# Patient Record
Sex: FEMALE | Race: WHITE | NOT HISPANIC OR LATINO | Employment: OTHER | URBAN - METROPOLITAN AREA
[De-identification: names, ages, dates, MRNs, and addresses within clinical notes are randomized per-mention and may not be internally consistent; named-entity substitution may affect disease eponyms.]

---

## 2023-03-30 DIAGNOSIS — I10 PRIMARY HYPERTENSION: Primary | ICD-10-CM

## 2023-03-30 RX ORDER — HYDROCHLOROTHIAZIDE 25 MG/1
TABLET ORAL
Qty: 90 TABLET | Refills: 3 | Status: SHIPPED | OUTPATIENT
Start: 2023-03-30 | End: 2023-12-27 | Stop reason: SDUPTHER

## 2023-06-26 PROBLEM — M15.0 PRIMARY OSTEOARTHRITIS INVOLVING MULTIPLE JOINTS: Status: ACTIVE | Noted: 2023-06-26

## 2023-06-26 PROBLEM — E03.9 ACQUIRED HYPOTHYROIDISM: Status: ACTIVE | Noted: 2023-06-26

## 2023-06-26 PROBLEM — I10 PRIMARY HYPERTENSION: Status: ACTIVE | Noted: 2023-06-26

## 2023-06-26 PROBLEM — M15.9 PRIMARY OSTEOARTHRITIS INVOLVING MULTIPLE JOINTS: Status: ACTIVE | Noted: 2023-06-26

## 2023-06-27 ENCOUNTER — OFFICE VISIT (OUTPATIENT)
Dept: PRIMARY CARE | Facility: CLINIC | Age: 83
End: 2023-06-27
Payer: MEDICARE

## 2023-06-27 ENCOUNTER — LAB (OUTPATIENT)
Dept: LAB | Facility: LAB | Age: 83
End: 2023-06-27
Payer: MEDICARE

## 2023-06-27 VITALS
WEIGHT: 199 LBS | OXYGEN SATURATION: 95 % | SYSTOLIC BLOOD PRESSURE: 166 MMHG | HEIGHT: 61 IN | DIASTOLIC BLOOD PRESSURE: 94 MMHG | HEART RATE: 76 BPM | BODY MASS INDEX: 37.57 KG/M2 | TEMPERATURE: 98.2 F

## 2023-06-27 DIAGNOSIS — Z00.00 MEDICARE ANNUAL WELLNESS VISIT, SUBSEQUENT: Primary | ICD-10-CM

## 2023-06-27 DIAGNOSIS — Z00.00 PHYSICAL EXAM: ICD-10-CM

## 2023-06-27 DIAGNOSIS — I10 PRIMARY HYPERTENSION: ICD-10-CM

## 2023-06-27 DIAGNOSIS — M15.9 PRIMARY OSTEOARTHRITIS INVOLVING MULTIPLE JOINTS: ICD-10-CM

## 2023-06-27 DIAGNOSIS — Z00.00 ROUTINE GENERAL MEDICAL EXAMINATION AT HEALTH CARE FACILITY: ICD-10-CM

## 2023-06-27 DIAGNOSIS — E03.9 ACQUIRED HYPOTHYROIDISM: ICD-10-CM

## 2023-06-27 LAB
ALANINE AMINOTRANSFERASE (SGPT) (U/L) IN SER/PLAS: 13 U/L (ref 7–45)
ALBUMIN (G/DL) IN SER/PLAS: 4.1 G/DL (ref 3.4–5)
ALKALINE PHOSPHATASE (U/L) IN SER/PLAS: 63 U/L (ref 33–136)
ANION GAP IN SER/PLAS: 13 MMOL/L (ref 10–20)
ASPARTATE AMINOTRANSFERASE (SGOT) (U/L) IN SER/PLAS: 17 U/L (ref 9–39)
BILIRUBIN TOTAL (MG/DL) IN SER/PLAS: 0.4 MG/DL (ref 0–1.2)
CALCIUM (MG/DL) IN SER/PLAS: 9.7 MG/DL (ref 8.6–10.3)
CARBON DIOXIDE, TOTAL (MMOL/L) IN SER/PLAS: 27 MMOL/L (ref 21–32)
CHLORIDE (MMOL/L) IN SER/PLAS: 103 MMOL/L (ref 98–107)
CHOLESTEROL (MG/DL) IN SER/PLAS: 178 MG/DL (ref 0–199)
CHOLESTEROL IN HDL (MG/DL) IN SER/PLAS: 48.7 MG/DL
CHOLESTEROL/HDL RATIO: 3.7
CREATININE (MG/DL) IN SER/PLAS: 1.01 MG/DL (ref 0.5–1.05)
ERYTHROCYTE DISTRIBUTION WIDTH (RATIO) BY AUTOMATED COUNT: 13.3 % (ref 11.5–14.5)
ERYTHROCYTE MEAN CORPUSCULAR HEMOGLOBIN CONCENTRATION (G/DL) BY AUTOMATED: 32.4 G/DL (ref 32–36)
ERYTHROCYTE MEAN CORPUSCULAR VOLUME (FL) BY AUTOMATED COUNT: 91 FL (ref 80–100)
ERYTHROCYTES (10*6/UL) IN BLOOD BY AUTOMATED COUNT: 4.37 X10E12/L (ref 4–5.2)
GFR FEMALE: 55 ML/MIN/1.73M2
GLUCOSE (MG/DL) IN SER/PLAS: 100 MG/DL (ref 74–99)
HEMATOCRIT (%) IN BLOOD BY AUTOMATED COUNT: 39.8 % (ref 36–46)
HEMOGLOBIN (G/DL) IN BLOOD: 12.9 G/DL (ref 12–16)
LDL: 89 MG/DL (ref 0–99)
LEUKOCYTES (10*3/UL) IN BLOOD BY AUTOMATED COUNT: 9.3 X10E9/L (ref 4.4–11.3)
NON HDL CHOLESTEROL: 129 MG/DL
PLATELETS (10*3/UL) IN BLOOD AUTOMATED COUNT: 436 X10E9/L (ref 150–450)
POTASSIUM (MMOL/L) IN SER/PLAS: 4.3 MMOL/L (ref 3.5–5.3)
PROTEIN TOTAL: 7 G/DL (ref 6.4–8.2)
SODIUM (MMOL/L) IN SER/PLAS: 139 MMOL/L (ref 136–145)
THYROTROPIN (MIU/L) IN SER/PLAS BY DETECTION LIMIT <= 0.05 MIU/L: 3.37 MIU/L (ref 0.44–3.98)
THYROXINE (T4) FREE (NG/DL) IN SER/PLAS: 2.85 NG/DL (ref 0.61–1.12)
TRIGLYCERIDE (MG/DL) IN SER/PLAS: 204 MG/DL (ref 0–149)
UREA NITROGEN (MG/DL) IN SER/PLAS: 26 MG/DL (ref 6–23)
VLDL: 41 MG/DL (ref 0–40)

## 2023-06-27 PROCEDURE — 1036F TOBACCO NON-USER: CPT | Performed by: FAMILY MEDICINE

## 2023-06-27 PROCEDURE — 84443 ASSAY THYROID STIM HORMONE: CPT

## 2023-06-27 PROCEDURE — 1170F FXNL STATUS ASSESSED: CPT | Performed by: FAMILY MEDICINE

## 2023-06-27 PROCEDURE — 99214 OFFICE O/P EST MOD 30 MIN: CPT | Performed by: FAMILY MEDICINE

## 2023-06-27 PROCEDURE — G0439 PPPS, SUBSEQ VISIT: HCPCS | Performed by: FAMILY MEDICINE

## 2023-06-27 PROCEDURE — 3077F SYST BP >= 140 MM HG: CPT | Performed by: FAMILY MEDICINE

## 2023-06-27 PROCEDURE — 36415 COLL VENOUS BLD VENIPUNCTURE: CPT

## 2023-06-27 PROCEDURE — 1159F MED LIST DOCD IN RCRD: CPT | Performed by: FAMILY MEDICINE

## 2023-06-27 PROCEDURE — 80053 COMPREHEN METABOLIC PANEL: CPT

## 2023-06-27 PROCEDURE — 85027 COMPLETE CBC AUTOMATED: CPT

## 2023-06-27 PROCEDURE — 84439 ASSAY OF FREE THYROXINE: CPT

## 2023-06-27 PROCEDURE — 3080F DIAST BP >= 90 MM HG: CPT | Performed by: FAMILY MEDICINE

## 2023-06-27 PROCEDURE — 99397 PER PM REEVAL EST PAT 65+ YR: CPT | Performed by: FAMILY MEDICINE

## 2023-06-27 PROCEDURE — 80061 LIPID PANEL: CPT

## 2023-06-27 RX ORDER — ASCORBIC ACID 500 MG
TABLET ORAL
COMMUNITY
Start: 2020-07-22

## 2023-06-27 RX ORDER — LOSARTAN POTASSIUM 50 MG/1
100 TABLET ORAL DAILY
COMMUNITY
End: 2023-12-27 | Stop reason: SDUPTHER

## 2023-06-27 RX ORDER — DIPHENHYDRAMINE HCL 25 MG
TABLET ORAL
COMMUNITY

## 2023-06-27 RX ORDER — LIDOCAINE 50 MG/G
PATCH TOPICAL
COMMUNITY
End: 2023-10-31

## 2023-06-27 RX ORDER — SELENIUM 50 MCG
50 TABLET ORAL DAILY
COMMUNITY

## 2023-06-27 RX ORDER — LEVOTHYROXINE SODIUM 50 UG/1
50 TABLET ORAL DAILY
COMMUNITY
End: 2023-09-15

## 2023-06-27 RX ORDER — AMLODIPINE BESYLATE 2.5 MG/1
2.5 TABLET ORAL DAILY
COMMUNITY
End: 2023-12-27 | Stop reason: SDUPTHER

## 2023-06-27 RX ORDER — CALCIUM CARBONATE 300MG(750)
1 TABLET,CHEWABLE ORAL DAILY
COMMUNITY
Start: 2020-07-22

## 2023-06-27 RX ORDER — CHOLECALCIFEROL (VITAMIN D3) 50 MCG
1 TABLET ORAL DAILY
COMMUNITY
Start: 2020-07-22

## 2023-06-27 RX ORDER — OMEPRAZOLE 20 MG/1
20 CAPSULE, DELAYED RELEASE ORAL DAILY
COMMUNITY
End: 2023-09-15

## 2023-06-27 ASSESSMENT — ENCOUNTER SYMPTOMS
ARTHRALGIAS: 1
DEPRESSION: 0
OCCASIONAL FEELINGS OF UNSTEADINESS: 1
BACK PAIN: 1
ENDOCRINE COMMENTS: SEE HPI
LOSS OF SENSATION IN FEET: 0

## 2023-06-27 ASSESSMENT — ACTIVITIES OF DAILY LIVING (ADL)
DOING_HOUSEWORK: INDEPENDENT
GROCERY_SHOPPING: INDEPENDENT
DRESSING: INDEPENDENT
TAKING_MEDICATION: INDEPENDENT
BATHING: INDEPENDENT
MANAGING_FINANCES: INDEPENDENT

## 2023-06-27 ASSESSMENT — PATIENT HEALTH QUESTIONNAIRE - PHQ9
SUM OF ALL RESPONSES TO PHQ9 QUESTIONS 1 AND 2: 0
SUM OF ALL RESPONSES TO PHQ9 QUESTIONS 1 AND 2: 0
2. FEELING DOWN, DEPRESSED OR HOPELESS: NOT AT ALL
1. LITTLE INTEREST OR PLEASURE IN DOING THINGS: NOT AT ALL
1. LITTLE INTEREST OR PLEASURE IN DOING THINGS: NOT AT ALL
2. FEELING DOWN, DEPRESSED OR HOPELESS: NOT AT ALL

## 2023-06-27 NOTE — PROGRESS NOTES
"Subjective   Reason for Visit: Pooja Wong is an 83 y.o. female here for a Medicare Wellness visit.          Reviewed all medications by prescribing practitioner or clinical pharmacist (such as prescriptions, OTCs, herbal therapies and supplements) and documented in the medical record.    She is here for Medicare wellness visit, general checkup and we are following her for hypertension, hypothyroidism and osteoarthritis particularly in her lower back.  In general, she feels well.        Patient Care Team:  Beni Dominguez MD as PCP - General  Beni Dominguez MD as PCP - [employer] PCP     Review of Systems   Cardiovascular:         See HPI   Endocrine:        See HPI   Musculoskeletal:  Positive for arthralgias, back pain and gait problem.   All other systems reviewed and are negative.      Objective   Vitals:  BP (!) 166/94 (BP Location: Left arm, Patient Position: Sitting, BP Cuff Size: Adult)   Pulse 76   Temp 36.8 °C (98.2 °F) (Skin)   Ht 1.549 m (5' 1\")   Wt 90.3 kg (199 lb)   SpO2 95%   BMI 37.60 kg/m²       Physical Exam  Cardiovascular:      Rate and Rhythm: Normal rate and regular rhythm.   Pulmonary:      Effort: Pulmonary effort is normal.      Breath sounds: Normal breath sounds.   Abdominal:      General: Abdomen is flat. There is no distension.      Palpations: Abdomen is soft. There is no mass.   Neurological:      General: No focal deficit present.      Mental Status: She is alert and oriented to person, place, and time.   Psychiatric:         Mood and Affect: Mood normal.         Behavior: Behavior normal.         Assessment/Plan   Problem List Items Addressed This Visit       Primary hypertension    Relevant Orders    CBC    Comprehensive Metabolic Panel    Lipid Panel    Acquired hypothyroidism    Relevant Orders    Thyroid Stimulating Hormone    Thyroxine, Free    Primary osteoarthritis involving multiple joints     Other Visit Diagnoses       Medicare annual " wellness visit, subsequent    -  Primary    Physical exam        Routine general medical examination at health care facility              I am updating her major blood chemistry today and she will follow-up with Dr. Bishop in 6 months.

## 2023-06-27 NOTE — PATIENT INSTRUCTIONS
I will notify you of your blood test results and it has been a pleasure for me to be your physician.

## 2023-06-28 ENCOUNTER — TELEPHONE (OUTPATIENT)
Dept: PRIMARY CARE | Facility: CLINIC | Age: 83
End: 2023-06-28
Payer: MEDICARE

## 2023-06-28 NOTE — TELEPHONE ENCOUNTER
Patient is taking Biotin.  Per Dr. Jett, do not change anything.  Biotin can give thyroid levels a false elevation.

## 2023-06-28 NOTE — TELEPHONE ENCOUNTER
----- Message from Beni Dominguez MD sent at 6/28/2023  4:55 AM EDT -----  Let her know her blood tests all look okay except the thyroid level got kicked out is being elevated.  Ask her if she is taking any biotin.  If so, that is likely the culprit and there is no further testing required.

## 2023-07-18 ENCOUNTER — PATIENT OUTREACH (OUTPATIENT)
Dept: CARE COORDINATION | Facility: CLINIC | Age: 83
End: 2023-07-18
Payer: MEDICARE

## 2023-09-14 DIAGNOSIS — K21.9 GASTROESOPHAGEAL REFLUX DISEASE WITHOUT ESOPHAGITIS: ICD-10-CM

## 2023-09-14 DIAGNOSIS — E03.9 ACQUIRED HYPOTHYROIDISM: Primary | ICD-10-CM

## 2023-09-15 RX ORDER — LEVOTHYROXINE SODIUM 50 UG/1
50 TABLET ORAL DAILY
Qty: 90 TABLET | Refills: 0 | Status: SHIPPED | OUTPATIENT
Start: 2023-09-15 | End: 2023-12-15

## 2023-09-15 RX ORDER — OMEPRAZOLE 20 MG/1
20 CAPSULE, DELAYED RELEASE ORAL DAILY
Qty: 30 CAPSULE | Refills: 0 | Status: SHIPPED | OUTPATIENT
Start: 2023-09-15 | End: 2023-11-27

## 2023-10-27 DIAGNOSIS — M15.9 PRIMARY OSTEOARTHRITIS INVOLVING MULTIPLE JOINTS: Primary | ICD-10-CM

## 2023-10-30 ENCOUNTER — TELEPHONE (OUTPATIENT)
Dept: PRIMARY CARE | Facility: CLINIC | Age: 83
End: 2023-10-30
Payer: MEDICARE

## 2023-10-30 NOTE — TELEPHONE ENCOUNTER
Patient states she has a lot of arthritis and theres a large lump on her back. (Ortho thought it was a big mass of muscle) She puts a patch on the lump at night to ease the pain so she can get sleep. She states it helps a lot. She's tried otc products and they don't seem to help   
n/a

## 2023-10-30 NOTE — TELEPHONE ENCOUNTER
This is a Dr. Jett pt.  The pt's , Rosalino, left a msg asking for a refill of the pt's 5% Lidocaine patch.  Pharm is [facility].

## 2023-10-31 RX ORDER — LIDOCAINE 50 MG/G
PATCH TOPICAL
Qty: 30 PATCH | Refills: 1 | Status: SHIPPED | OUTPATIENT
Start: 2023-10-31 | End: 2023-11-05 | Stop reason: SDUPTHER

## 2023-10-31 NOTE — TELEPHONE ENCOUNTER
I see you have seen this pt for her last 4 visits.  She is asking for a refill of her Lidocaine patches 5% for bilat sciatica.  I originally sent the msg to Piedad Nayak, per the alpha split.  Pt's  called again regarding the script. Pharm is [facility].

## 2023-11-05 DIAGNOSIS — M15.9 PRIMARY OSTEOARTHRITIS INVOLVING MULTIPLE JOINTS: ICD-10-CM

## 2023-11-05 RX ORDER — LIDOCAINE 50 MG/G
PATCH TOPICAL
Qty: 30 PATCH | Refills: 1 | Status: SHIPPED | OUTPATIENT
Start: 2023-11-05 | End: 2023-12-27 | Stop reason: SDUPTHER

## 2023-11-21 ENCOUNTER — OFFICE VISIT (OUTPATIENT)
Dept: UROLOGY | Facility: CLINIC | Age: 83
End: 2023-11-21
Payer: MEDICARE

## 2023-11-21 DIAGNOSIS — N81.89 PELVIC FLOOR WEAKNESS: ICD-10-CM

## 2023-11-21 DIAGNOSIS — Z96.0 PRESENCE OF PESSARY: ICD-10-CM

## 2023-11-21 DIAGNOSIS — N95.2 VAGINAL ATROPHY: ICD-10-CM

## 2023-11-21 DIAGNOSIS — N39.41 URGE URINARY INCONTINENCE: Primary | ICD-10-CM

## 2023-11-21 PROCEDURE — 1159F MED LIST DOCD IN RCRD: CPT | Performed by: OBSTETRICS & GYNECOLOGY

## 2023-11-21 PROCEDURE — 99213 OFFICE O/P EST LOW 20 MIN: CPT | Performed by: OBSTETRICS & GYNECOLOGY

## 2023-11-21 PROCEDURE — 1036F TOBACCO NON-USER: CPT | Performed by: OBSTETRICS & GYNECOLOGY

## 2023-11-21 NOTE — PROGRESS NOTES
Subjective   Patient ID: Pooja Wong is a 83 y.o. female who presents for pessary maintenance.  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    The patient continues to be  satisfied with the pessary, however she notes pressure around the pessary. She is not utilizing the vaginal estrogen cream thrice weekly. She denies any abnormal vaginal bleeding or discharge.     She continues to note urinary frequency and urgency with episodes of urgency. She utilizes a thin pads to avoid accidents.  All her voids are small volume and she has the urgency to void. She void 6-8 times during the day and is not bothered by this     She denies any bowel related complaints, no fecal or flatal incontinence.     She has no other complaints.    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     The patient is satisfied with the pessary, however she notes pressure around the pessary. She is also noting vaginal irritation and itching.She is not utilizing the vaginal estrogen cream thrice weekly. She denies any abnormal vaginal bleeding or discharge.     She continues to note urinary frequency and urgency. All her voids are small volume and she has the urgency to void. She void 6-8 times during the day and is not bothered by this     She denies any bowel related complaints, no fecal or flatal incontinence.     She has no other complaints.        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     The patient continues to note urinary frequency and urgency. All her voids are small volume and she has the urgency to void. She void 6-8 times during the day and is not bothered by this. She does not wish to try any medications as of today.     She is satisfied from the pessary standpoint, she denies any bulge complaints. She continues to utilize the vaginal estrogen cream thrice weekly. She denies any vaginal complaints, no abnormal vaginal bleeding or discharge.     She denies any bowel related complaints, no fecal or flatal incontinence.     She has no other complaints.           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     The patient presents today for pessary fitting, she denies any new complaints. She continues to utilize the vaginal estrogen cream.     She complaints of constipation but denies any fecal or flatal incontinence.      She has no other complaints.     From previous note   82- year-old patient presenting as a referral from Dr. Garcia with complaints of urinary urgency frequency and pelvic organ prolapse.     The patient complaints of urinary urgency and frequency, she voids every 2-3 hours during the day and notices 3-4 episodes of nocturia. She states she can hardly make it tot he bathroom without leaking in the morning. She denies any enuresis. She denies leaking on laughing, cough or sneezing. She has noticed UTI like symptoms in the past and utilizes Alfalfa with improvement in her symptoms. She denies any history of nephrolithiasis, gross hematuria or chronic recurrent UTIs.       She complaints of noticing bulge for the past several months which has gradually worsened. She has tried doing Kegels with no  improvement in her symptoms.She is sexually active. She complaints of episodic bleeding and irritation and vaginal burning when she voids. She denies any abnormal discharge. She has a history of complete hysterectomy approximately 40 years ago.     She denies any bowel related complaints, no fecal or flatal incontinence.     She has no other complaints.          Review of Systems  Constitutional: No fever, No chills and No fatigue.   Eyes: No vision problems and No dryness of the eyes.   ENT: No dry mouth, No hearing loss and No nosebleeds.   Cardiovascular: No chest pain, No palpitations and No orthopnea.   Respiratory: No shortness of breath, No cough and No wheezing.   Gastrointestinal: No abdominal pain, No constipation, No nausea, No diarrhea, No vomiting and No melena.   Genitourinary: As noted in HPI.   Musculoskeletal: No back pain, No myalgias, No muscle weakness, No joint swelling and No leg edema.   Integumentary: No rashes, No skin lesion and No itching.   Neurological: No headache, No numbness and No dizziness.   Psychiatric: No sleep disturbances, No anxiety and No depression.   Endocrine: No hot flashes, No loss of hair and No hirsutism.   Hematologic/Lymphatic: No swollen glands, No tendency for easy bleeding and No tendency for easy bruising.   All other systems have been reviewed and are negative for complaint.        Objective   Physical Exam  Patient ID: Pooja Wong is a 83 y.o. female.    Procedures  [facility] Procedure:  Pessary check.   Indication (s): pelvic organ prolapse.   Findings include: cystocele,~vaginal vault prolapse,~atrophy~and~2.5 inch Gellhorn short stem was removed, cleaned, and replaced, ~No excoriations.     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     #1 we discussed the patient's prolapse complaints. We discussed how this relates to her complaints of vaginal bleeding and the small area of excoriated tissue noted on exam. We discussed protecting this area with topical emollient like Vaseline or Aquaphor. We discussed treating her vaginal atrophy with vaginal estrogen cream and the safety, efficacy, proper utilization of this product. We discussed expectant management, pelvic floor physical therapy, pessary management, and surgical options. She was successfully fitted with a 2.5 inch Gellhorn short stem pessary 2/21/2023. 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 She however appears to be having excellent results with her pessary. She will continue every 4 month follow-up.     2. We discussed the patient's urge urinary incontinence complaints. We again discussed at length today the [facility] care pathway including first, second, third line therapies. She is not interested in therapy at this time.     3. We again discussed the safety, efficacy, proper utilization of vaginal estrogen therapy. She will restart this 3 times a week moving forward.     4. The patient will follow-up in March 2024 for pessary maintenance     LYNDSAY Lay MD     Scribe Attestation  By signing my name below, I, Fior Holcomb attest that this documentation has been prepared under the direction and in the presence of Reggie Lay MD. All medical record entries made by the Scribe were at my direction or personally dictated by me. I have reviewed the chart and agree that the record accurately reflects my personal performance of the history, physical exam, discussion and plan.

## 2023-11-24 DIAGNOSIS — K21.9 GASTROESOPHAGEAL REFLUX DISEASE WITHOUT ESOPHAGITIS: ICD-10-CM

## 2023-11-27 RX ORDER — OMEPRAZOLE 20 MG/1
20 CAPSULE, DELAYED RELEASE ORAL DAILY
Qty: 30 CAPSULE | Refills: 0 | Status: SHIPPED | OUTPATIENT
Start: 2023-11-27 | End: 2023-12-27 | Stop reason: SDUPTHER

## 2023-12-15 DIAGNOSIS — E03.9 ACQUIRED HYPOTHYROIDISM: ICD-10-CM

## 2023-12-15 RX ORDER — LEVOTHYROXINE SODIUM 50 UG/1
50 TABLET ORAL DAILY
Qty: 30 TABLET | Refills: 0 | Status: SHIPPED | OUTPATIENT
Start: 2023-12-15 | End: 2024-01-16 | Stop reason: SDUPTHER

## 2023-12-27 ENCOUNTER — OFFICE VISIT (OUTPATIENT)
Dept: PRIMARY CARE | Facility: CLINIC | Age: 83
End: 2023-12-27
Payer: MEDICARE

## 2023-12-27 VITALS
BODY MASS INDEX: 36.05 KG/M2 | TEMPERATURE: 97.5 F | HEART RATE: 75 BPM | WEIGHT: 190.8 LBS | DIASTOLIC BLOOD PRESSURE: 78 MMHG | SYSTOLIC BLOOD PRESSURE: 162 MMHG | OXYGEN SATURATION: 94 %

## 2023-12-27 DIAGNOSIS — E03.9 ACQUIRED HYPOTHYROIDISM: ICD-10-CM

## 2023-12-27 DIAGNOSIS — M15.9 PRIMARY OSTEOARTHRITIS INVOLVING MULTIPLE JOINTS: ICD-10-CM

## 2023-12-27 DIAGNOSIS — K21.9 GASTROESOPHAGEAL REFLUX DISEASE WITHOUT ESOPHAGITIS: ICD-10-CM

## 2023-12-27 DIAGNOSIS — I10 PRIMARY HYPERTENSION: ICD-10-CM

## 2023-12-27 PROCEDURE — 3077F SYST BP >= 140 MM HG: CPT | Performed by: STUDENT IN AN ORGANIZED HEALTH CARE EDUCATION/TRAINING PROGRAM

## 2023-12-27 PROCEDURE — 1159F MED LIST DOCD IN RCRD: CPT | Performed by: STUDENT IN AN ORGANIZED HEALTH CARE EDUCATION/TRAINING PROGRAM

## 2023-12-27 PROCEDURE — 1036F TOBACCO NON-USER: CPT | Performed by: STUDENT IN AN ORGANIZED HEALTH CARE EDUCATION/TRAINING PROGRAM

## 2023-12-27 PROCEDURE — 1160F RVW MEDS BY RX/DR IN RCRD: CPT | Performed by: STUDENT IN AN ORGANIZED HEALTH CARE EDUCATION/TRAINING PROGRAM

## 2023-12-27 PROCEDURE — 3078F DIAST BP <80 MM HG: CPT | Performed by: STUDENT IN AN ORGANIZED HEALTH CARE EDUCATION/TRAINING PROGRAM

## 2023-12-27 PROCEDURE — 99214 OFFICE O/P EST MOD 30 MIN: CPT | Performed by: STUDENT IN AN ORGANIZED HEALTH CARE EDUCATION/TRAINING PROGRAM

## 2023-12-27 RX ORDER — ACETAMINOPHEN 500 MG
TABLET ORAL 3 TIMES DAILY
COMMUNITY

## 2023-12-27 RX ORDER — BRIMONIDINE TARTRATE 2 MG/ML
SOLUTION/ DROPS OPHTHALMIC
COMMUNITY
Start: 2022-09-26

## 2023-12-27 RX ORDER — LIDOCAINE 50 MG/G
PATCH TOPICAL
Qty: 90 PATCH | Refills: 1 | Status: SHIPPED | OUTPATIENT
Start: 2023-12-27

## 2023-12-27 RX ORDER — AMLODIPINE BESYLATE 2.5 MG/1
2.5 TABLET ORAL DAILY
Qty: 90 TABLET | Refills: 1 | Status: SHIPPED | OUTPATIENT
Start: 2023-12-27

## 2023-12-27 RX ORDER — ACETAMINOPHEN AND PHENYLEPHRINE HCL 325; 5 MG/1; MG/1
TABLET ORAL DAILY
COMMUNITY
End: 2024-03-14 | Stop reason: WASHOUT

## 2023-12-27 RX ORDER — OMEPRAZOLE 20 MG/1
20 CAPSULE, DELAYED RELEASE ORAL DAILY
Qty: 90 CAPSULE | Refills: 3 | Status: SHIPPED | OUTPATIENT
Start: 2023-12-27

## 2023-12-27 RX ORDER — LOSARTAN POTASSIUM 100 MG/1
100 TABLET ORAL DAILY
Qty: 90 TABLET | Refills: 1 | Status: SHIPPED | OUTPATIENT
Start: 2023-12-27

## 2023-12-27 RX ORDER — CALCIUM CARBONATE/VITAMIN D3 600 MG-10
1 TABLET ORAL DAILY
COMMUNITY
Start: 2020-07-22

## 2023-12-27 RX ORDER — ESTRADIOL 0.1 MG/G
CREAM VAGINAL
COMMUNITY
Start: 2023-02-06 | End: 2024-04-02 | Stop reason: SDUPTHER

## 2023-12-27 RX ORDER — HYDROCHLOROTHIAZIDE 25 MG/1
25 TABLET ORAL DAILY
Qty: 90 TABLET | Refills: 1 | Status: SHIPPED | OUTPATIENT
Start: 2023-12-27

## 2023-12-27 ASSESSMENT — PATIENT HEALTH QUESTIONNAIRE - PHQ9
2. FEELING DOWN, DEPRESSED OR HOPELESS: NOT AT ALL
1. LITTLE INTEREST OR PLEASURE IN DOING THINGS: NOT AT ALL
SUM OF ALL RESPONSES TO PHQ9 QUESTIONS 1 AND 2: 0

## 2023-12-27 ASSESSMENT — ENCOUNTER SYMPTOMS
NAUSEA: 0
WHEEZING: 0
ABDOMINAL PAIN: 0
HEMATURIA: 0
CHILLS: 0
COUGH: 0
FATIGUE: 0
NERVOUS/ANXIOUS: 0
PALPITATIONS: 0
DYSURIA: 0
DIARRHEA: 0
HEADACHES: 0
FREQUENCY: 0
NUMBNESS: 0
DYSPHORIC MOOD: 0
DIZZINESS: 0
SHORTNESS OF BREATH: 0
FEVER: 0
CONSTIPATION: 0

## 2023-12-27 NOTE — PROGRESS NOTES
Subjective   Patient ID: Pooja Wong is a 83 y.o. female who presents for Transfer Of Care (From Dr Dominguez), Follow-up (6 mon fu), and Med Refill.    HPI     Pooja Wong is here for follow-up of  hypertension. Current medications ARB, CCB, and Thiazide.  Home blood pressure readings: not doing. Salt intake and diet: salt not added to cooking and salt shaker not on table.  Associated signs and symptoms: none. Patient denies: blurred vision, chest pain, dyspnea, headache, and palpitations.  Medication compliance: not currently on medications for this problem.     Lidocaine patches help. She uses it daily. Would like 90 days of it. Keeps her from using other medications as often.    This summer her T4 was quite elevated with a high normal TSH    Review of Systems   Constitutional:  Negative for chills, fatigue and fever.   Respiratory:  Negative for cough, shortness of breath and wheezing.    Cardiovascular:  Negative for chest pain, palpitations and leg swelling.   Gastrointestinal:  Negative for abdominal pain, constipation, diarrhea and nausea.   Genitourinary:  Negative for dysuria, frequency, hematuria and urgency.   Neurological:  Negative for dizziness, numbness and headaches.   Psychiatric/Behavioral:  Negative for dysphoric mood. The patient is not nervous/anxious.        Objective   /78 (BP Location: Left arm, Patient Position: Sitting)   Pulse 75   Temp 36.4 °C (97.5 °F) (Temporal)   Wt 86.5 kg (190 lb 12.8 oz)   SpO2 94%   BMI 36.05 kg/m²     Physical Exam  Constitutional:       Appearance: Normal appearance.   HENT:      Head: Normocephalic and atraumatic.   Eyes:      Extraocular Movements: Extraocular movements intact.      Pupils: Pupils are equal, round, and reactive to light.   Cardiovascular:      Rate and Rhythm: Normal rate and regular rhythm.      Heart sounds: Normal heart sounds. No murmur heard.  Pulmonary:      Effort: Pulmonary effort is normal.      Breath sounds:  Normal breath sounds. No wheezing.   Abdominal:      General: Bowel sounds are normal.      Palpations: Abdomen is soft.      Tenderness: There is no abdominal tenderness. There is no guarding.   Musculoskeletal:         General: Normal range of motion.   Skin:     General: Skin is warm and dry.   Neurological:      General: No focal deficit present.      Mental Status: She is alert and oriented to person, place, and time.   Psychiatric:         Mood and Affect: Mood normal.         Behavior: Behavior normal.         Assessment/Plan   Problem List Items Addressed This Visit       Primary hypertension    Relevant Medications    hydroCHLOROthiazide (HYDRODiuril) 25 mg tablet    losartan (Cozaar) 100 mg tablet    Acquired hypothyroidism    Relevant Orders    TSH    T4, free    Primary osteoarthritis involving multiple joints    Relevant Medications    lidocaine (Lidoderm) 5 % patch     Other Visit Diagnoses       Gastroesophageal reflux disease without esophagitis        Relevant Medications    omeprazole (PriLOSEC) 20 mg DR capsule          Will have her check her blood pressure at home and we can adjust medication pending that  Will have her recheck her thyroid given that her T4 was significantly elevated with a high normal TSH.  Refilled lidocaine patches.  I do not recommend using these daily but she states that this is something that does help and it keeps her from having to take other medications.       Patient understands and agrees with treatment plan    Essie Bishop, DO   12/27/23

## 2023-12-27 NOTE — PATIENT INSTRUCTIONS
Please check your BP at home daily, write down your results, and bring it to your next appt. Check it after sitting for at least 30 minutes. Do not smoke for 30 minutes prior to checking your blood pressure and avoid caffeine prior to checking.  Refilling lidocaine patches  Will have you recheck your thyroid.  Please stop biotin 24 to 48 hours prior to having this done

## 2024-01-12 ENCOUNTER — LAB (OUTPATIENT)
Dept: LAB | Facility: LAB | Age: 84
End: 2024-01-12
Payer: MEDICARE

## 2024-01-12 DIAGNOSIS — E03.9 ACQUIRED HYPOTHYROIDISM: ICD-10-CM

## 2024-01-12 LAB
T4 FREE SERPL-MCNC: 1.03 NG/DL (ref 0.61–1.12)
TSH SERPL-ACNC: 2.39 MIU/L (ref 0.44–3.98)

## 2024-01-12 PROCEDURE — 84439 ASSAY OF FREE THYROXINE: CPT

## 2024-01-12 PROCEDURE — 84443 ASSAY THYROID STIM HORMONE: CPT

## 2024-01-12 PROCEDURE — 36415 COLL VENOUS BLD VENIPUNCTURE: CPT

## 2024-01-16 DIAGNOSIS — E03.9 ACQUIRED HYPOTHYROIDISM: ICD-10-CM

## 2024-01-16 RX ORDER — LEVOTHYROXINE SODIUM 50 UG/1
50 TABLET ORAL DAILY
Qty: 90 TABLET | Refills: 3 | Status: SHIPPED | OUTPATIENT
Start: 2024-01-16

## 2024-03-13 PROBLEM — N89.8 VAGINAL IRRITATION: Status: RESOLVED | Noted: 2024-03-13 | Resolved: 2024-03-13

## 2024-03-13 PROBLEM — M62.89 PELVIC FLOOR DYSFUNCTION: Status: ACTIVE | Noted: 2023-02-28

## 2024-03-13 PROBLEM — H91.93 BILATERAL HEARING LOSS: Status: ACTIVE | Noted: 2024-03-13

## 2024-03-13 PROBLEM — R06.09 DYSPNEA ON EXERTION: Status: ACTIVE | Noted: 2024-03-13

## 2024-03-13 PROBLEM — R39.9 UTI SYMPTOMS: Status: RESOLVED | Noted: 2024-03-13 | Resolved: 2024-03-13

## 2024-03-13 PROBLEM — N39.0 ACUTE URINARY TRACT INFECTION: Status: RESOLVED | Noted: 2024-03-13 | Resolved: 2024-03-13

## 2024-03-13 PROBLEM — J30.2 SEASONAL AND PERENNIAL ALLERGIC RHINITIS: Status: RESOLVED | Noted: 2024-03-13 | Resolved: 2024-03-13

## 2024-03-13 PROBLEM — D31.01: Status: RESOLVED | Noted: 2018-01-09 | Resolved: 2024-03-13

## 2024-03-13 PROBLEM — J30.89 SEASONAL AND PERENNIAL ALLERGIC RHINITIS: Status: RESOLVED | Noted: 2024-03-13 | Resolved: 2024-03-13

## 2024-03-13 PROBLEM — H90.6 HEARING LOSS, MIXED, BILATERAL: Status: ACTIVE | Noted: 2024-03-13

## 2024-03-13 PROBLEM — R92.8 ABNORMAL MAMMOGRAM: Status: ACTIVE | Noted: 2024-03-13

## 2024-03-13 PROBLEM — N81.11 CYSTOCELE, MIDLINE: Status: ACTIVE | Noted: 2024-03-13

## 2024-03-13 PROBLEM — M19.90 ARTHRITIS, DEGENERATIVE: Status: ACTIVE | Noted: 2024-03-13

## 2024-03-13 PROBLEM — N39.0 ACUTE UTI: Status: RESOLVED | Noted: 2024-03-13 | Resolved: 2024-03-13

## 2024-03-13 PROBLEM — N95.2 ATROPHY OF VAGINA: Status: ACTIVE | Noted: 2024-03-13

## 2024-03-13 PROBLEM — N95.0 POSTMENOPAUSAL BLEEDING: Status: ACTIVE | Noted: 2023-02-28

## 2024-03-13 PROBLEM — M25.561 RIGHT KNEE PAIN: Status: RESOLVED | Noted: 2022-10-06 | Resolved: 2024-03-13

## 2024-03-13 PROBLEM — M54.32 BILATERAL SCIATICA: Status: ACTIVE | Noted: 2024-03-13

## 2024-03-13 PROBLEM — R00.2 HEART PALPITATIONS: Status: ACTIVE | Noted: 2024-03-13

## 2024-03-13 PROBLEM — K86.9 PANCREATIC LESION (HHS-HCC): Status: ACTIVE | Noted: 2024-03-13

## 2024-03-13 PROBLEM — M54.50 ACUTE LOW BACK PAIN: Status: RESOLVED | Noted: 2022-03-21 | Resolved: 2024-03-13

## 2024-03-13 PROBLEM — Z96.0 VAGINAL PESSARY PRESENT: Status: ACTIVE | Noted: 2024-03-13

## 2024-03-13 PROBLEM — M70.70 BURSITIS OF HIP: Status: ACTIVE | Noted: 2024-03-13

## 2024-03-13 PROBLEM — K29.60 BILE-INDUCED GASTRITIS: Status: RESOLVED | Noted: 2024-03-13 | Resolved: 2024-03-13

## 2024-03-13 PROBLEM — N64.89 BREAST ASYMMETRY: Status: ACTIVE | Noted: 2024-03-13

## 2024-03-13 PROBLEM — M54.31 BILATERAL SCIATICA: Status: ACTIVE | Noted: 2024-03-13

## 2024-03-13 PROBLEM — N39.41 URGE INCONTINENCE: Status: ACTIVE | Noted: 2024-03-13

## 2024-03-14 ENCOUNTER — OFFICE VISIT (OUTPATIENT)
Dept: PRIMARY CARE | Facility: CLINIC | Age: 84
End: 2024-03-14
Payer: MEDICARE

## 2024-03-14 VITALS
SYSTOLIC BLOOD PRESSURE: 150 MMHG | OXYGEN SATURATION: 98 % | TEMPERATURE: 98.1 F | DIASTOLIC BLOOD PRESSURE: 92 MMHG | HEART RATE: 74 BPM | WEIGHT: 188 LBS | BODY MASS INDEX: 35.52 KG/M2

## 2024-03-14 DIAGNOSIS — B35.6 TINEA CRURIS: Primary | ICD-10-CM

## 2024-03-14 PROCEDURE — 1159F MED LIST DOCD IN RCRD: CPT | Performed by: NURSE PRACTITIONER

## 2024-03-14 PROCEDURE — 3077F SYST BP >= 140 MM HG: CPT | Performed by: NURSE PRACTITIONER

## 2024-03-14 PROCEDURE — 3080F DIAST BP >= 90 MM HG: CPT | Performed by: NURSE PRACTITIONER

## 2024-03-14 PROCEDURE — 1036F TOBACCO NON-USER: CPT | Performed by: NURSE PRACTITIONER

## 2024-03-14 PROCEDURE — 99213 OFFICE O/P EST LOW 20 MIN: CPT | Performed by: NURSE PRACTITIONER

## 2024-03-14 PROCEDURE — 1123F ACP DISCUSS/DSCN MKR DOCD: CPT | Performed by: NURSE PRACTITIONER

## 2024-03-14 PROCEDURE — 1160F RVW MEDS BY RX/DR IN RCRD: CPT | Performed by: NURSE PRACTITIONER

## 2024-03-14 ASSESSMENT — PATIENT HEALTH QUESTIONNAIRE - PHQ9
1. LITTLE INTEREST OR PLEASURE IN DOING THINGS: NOT AT ALL
SUM OF ALL RESPONSES TO PHQ9 QUESTIONS 1 AND 2: 0
2. FEELING DOWN, DEPRESSED OR HOPELESS: NOT AT ALL

## 2024-03-14 NOTE — PATIENT INSTRUCTIONS
Please use monistat, lotrimin or Lamisil cream to the area twice daily.   Let me know in 7-10 days if no better.

## 2024-03-14 NOTE — PROGRESS NOTES
Subjective   Patient ID: Pooja Wong is a 83 y.o. female who presents for skin complaint (Possible rash in right groin area x 1 week).    HPI Presents  today with itching/burning rash in her groin skin folds.  Has had for about a week.   No drainage or pain,   Feel well otherwise    Review of Systems   Constitutional: Negative.    Skin:         As noted in HPI         Objective   BP (!) 150/92 (BP Location: Left arm, Patient Position: Sitting)   Pulse 74   Temp 36.7 °C (98.1 °F) (Temporal)   Wt 85.3 kg (188 lb)   SpO2 98%   BMI 35.52 kg/m²     Physical Exam  Constitutional:       Appearance: Normal appearance.   Cardiovascular:      Rate and Rhythm: Normal rate and regular rhythm.   Pulmonary:      Effort: Pulmonary effort is normal.      Breath sounds: Normal breath sounds.   Musculoskeletal:         General: Normal range of motion.   Skin:     General: Skin is warm.      Comments: Erythematous flat rash inner right groin fold with several satellites noted   Neurological:      General: No focal deficit present.      Mental Status: She is alert.   Psychiatric:         Mood and Affect: Mood normal.         Behavior: Behavior normal.         Assessment/Plan   Problem List Items Addressed This Visit    None  Visit Diagnoses         Codes    Tinea cruris    -  Primary B35.6

## 2024-03-15 ASSESSMENT — ENCOUNTER SYMPTOMS
CONSTITUTIONAL NEGATIVE: 1
ROS SKIN COMMENTS: AS NOTED IN HPI

## 2024-03-27 NOTE — PROGRESS NOTES
Subjective   Patient ID: Pooja Wong is a 83 y.o. female who presents for pessary maintenance.  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    The patient continues to be  satisfied with the pessary, however she notes pressure around the pessary. She is utilizing the vaginal estrogen cream when she remembers. She denies any abnormal vaginal bleeding or discharge.     She continues to note urinary frequency and urgency with episodes of urgency. She utilizes a thin pads to avoid accidents.  All her voids are small volume and she has the urgency to void. She void 6-8 times during the day and is not bothered by this     She denies any bowel related complaints, no fecal or flatal incontinence.     She has no other complaints.    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    The patient continues to be  satisfied with the pessary, however she notes pressure around the pessary. She is not utilizing the vaginal estrogen cream thrice weekly. She denies any abnormal vaginal bleeding or discharge.     She continues to note urinary frequency and urgency with episodes of urgency. She utilizes a thin pads to avoid accidents.  All her voids are small volume and she has the urgency to void. She void 6-8 times during the day and is not bothered by this     She denies any bowel related complaints, no fecal or flatal incontinence.     She has no other complaints.    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     The patient is satisfied with the pessary, however she notes pressure around the pessary. She is also noting vaginal irritation and itching.She is not utilizing the vaginal estrogen cream thrice weekly. She denies any abnormal vaginal bleeding or discharge.     She continues to note urinary frequency and urgency. All her voids are small volume and she has the urgency to void. She void 6-8 times during the day and is not bothered by this     She denies any bowel related complaints, no fecal or flatal incontinence.     She has no other complaints.        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     The patient continues to note urinary frequency and urgency. All her voids are small volume and she has the urgency to void. She void 6-8 times during the day and is not bothered by this. She does not wish to try any medications as of today.     She is satisfied from the pessary standpoint, she denies any bulge complaints. She continues to utilize the vaginal estrogen cream thrice weekly. She denies any vaginal complaints, no abnormal vaginal bleeding or discharge.     She denies any bowel related complaints, no fecal or flatal incontinence.     She has no other complaints.           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     The patient presents today for pessary fitting, she denies any new complaints. She continues to utilize the vaginal estrogen cream.     She complaints of  constipation but denies any fecal or flatal incontinence.      She has no other complaints.     From previous note   82- year-old patient presenting as a referral from Dr. Garcia with complaints of urinary urgency frequency and pelvic organ prolapse.     The patient complaints of urinary urgency and frequency, she voids every 2-3 hours during the day and notices 3-4 episodes of nocturia. She states she can hardly make it tot he bathroom without leaking in the morning. She denies any enuresis. She denies leaking on laughing, cough or sneezing. She has noticed UTI like symptoms in the past and utilizes Alfalfa with improvement in her symptoms. She denies any history of nephrolithiasis, gross hematuria or chronic recurrent UTIs.       She complaints of noticing bulge for the past several months which has gradually worsened. She has tried doing Kegels with no improvement in her symptoms.She is sexually active. She complaints of episodic bleeding and irritation and vaginal burning when she voids. She denies any abnormal discharge. She has a history of complete hysterectomy approximately 40 years ago.     She denies any bowel related complaints, no fecal or flatal incontinence.     She has no other complaints.          Review of Systems  Constitutional: No fever, No chills and No fatigue.   Eyes: No vision problems and No dryness of the eyes.   ENT: No dry mouth, No hearing loss and No nosebleeds.   Cardiovascular: No chest pain, No palpitations and No orthopnea.   Respiratory: No shortness of breath, No cough and No wheezing.   Gastrointestinal: No abdominal pain, No constipation, No nausea, No diarrhea, No vomiting and No melena.   Genitourinary: As noted in HPI.   Musculoskeletal: No back pain, No myalgias, No muscle weakness, No joint swelling and No leg edema.   Integumentary: No rashes, No skin lesion and No itching.   Neurological: No headache, No numbness and No dizziness.   Psychiatric: No sleep disturbances,  No anxiety and No depression.   Endocrine: No hot flashes, No loss of hair and No hirsutism.   Hematologic/Lymphatic: No swollen glands, No tendency for easy bleeding and No tendency for easy bruising.   All other systems have been reviewed and are negative for complaint.        Objective   Physical Exam  Patient ID: Pooja Wong is a 83 y.o. female.    Procedures  FP[facility] Procedure:  Pessary check.   Indication (s): pelvic organ prolapse.   Findings include: cystocele,~vaginal vault prolapse,~atrophy~and~2.5 inch Gellhorn short stem was removed, cleaned, and replaced, ~No excoriations.     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     #1 we discussed the patient's prolapse complaints. We discussed expectant management, pelvic floor physical therapy, pessary management, and surgical options. She was successfully fitted with a 2.5 inch Gellhorn short stem pessary 2/21/2023. 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 She however appears to be having excellent results with her pessary. She will continue every 4-5 month follow-up.     2. We discussed the patient's urge urinary incontinence complaints. We again discussed at length today the AUA OAB care pathway including first, second, third line therapies. She is not interested in therapy at this time.     3. We again discussed the safety, efficacy, proper utilization of vaginal estrogen therapy. She will restart this 3 times a week moving forward.     4. The patient will follow-up in August 2024 for pessary maintenance.     LYNDSAY Lay MD     Scribe Attestation  By signing my name below, Maty MONTAGUE Scribe attest that this documentation has been  prepared under the direction and in the presence of Reggie Lay MD. All medical record entries made by the Scribe were at my direction or personally dictated by me. I have reviewed the chart and agree that the record accurately reflects my personal performance of the history, physical exam, discussion and plan.

## 2024-04-02 ENCOUNTER — OFFICE VISIT (OUTPATIENT)
Dept: UROLOGY | Facility: CLINIC | Age: 84
End: 2024-04-02
Payer: MEDICARE

## 2024-04-02 DIAGNOSIS — N81.89 PELVIC FLOOR WEAKNESS: ICD-10-CM

## 2024-04-02 DIAGNOSIS — Z96.0 PRESENCE OF PESSARY: ICD-10-CM

## 2024-04-02 DIAGNOSIS — N39.41 URGE URINARY INCONTINENCE: Primary | ICD-10-CM

## 2024-04-02 DIAGNOSIS — N95.2 VAGINAL ATROPHY: ICD-10-CM

## 2024-04-02 PROCEDURE — 99214 OFFICE O/P EST MOD 30 MIN: CPT | Performed by: OBSTETRICS & GYNECOLOGY

## 2024-04-02 PROCEDURE — G2211 COMPLEX E/M VISIT ADD ON: HCPCS | Performed by: OBSTETRICS & GYNECOLOGY

## 2024-04-02 PROCEDURE — 1160F RVW MEDS BY RX/DR IN RCRD: CPT | Performed by: OBSTETRICS & GYNECOLOGY

## 2024-04-02 PROCEDURE — 1159F MED LIST DOCD IN RCRD: CPT | Performed by: OBSTETRICS & GYNECOLOGY

## 2024-04-02 PROCEDURE — 1123F ACP DISCUSS/DSCN MKR DOCD: CPT | Performed by: OBSTETRICS & GYNECOLOGY

## 2024-04-02 RX ORDER — ESTRADIOL 0.1 MG/G
CREAM VAGINAL
Qty: 42.5 G | Refills: 3 | Status: SHIPPED | OUTPATIENT
Start: 2024-04-02

## 2024-04-02 NOTE — PATIENT INSTRUCTIONS
Please continue your vaginal estrogen therapy 3 times a week moving forward.  Use a pea-sized or dime size amount vaginally.  Do not use the plastic applicator.    Please follow-up in August/September 2024 for pessary maintenance.    Please contact the clinic with any questions or concerns.    [phone]

## 2024-06-21 DIAGNOSIS — M15.9 PRIMARY OSTEOARTHRITIS INVOLVING MULTIPLE JOINTS: ICD-10-CM

## 2024-06-21 RX ORDER — LIDOCAINE 50 MG/G
PATCH TOPICAL
Qty: 90 PATCH | Refills: 1 | Status: SHIPPED | OUTPATIENT
Start: 2024-06-21

## 2024-06-28 ENCOUNTER — APPOINTMENT (OUTPATIENT)
Dept: PRIMARY CARE | Facility: CLINIC | Age: 84
End: 2024-06-28
Payer: MEDICARE

## 2024-06-28 VITALS
WEIGHT: 179.2 LBS | SYSTOLIC BLOOD PRESSURE: 160 MMHG | DIASTOLIC BLOOD PRESSURE: 70 MMHG | TEMPERATURE: 98.4 F | OXYGEN SATURATION: 93 % | BODY MASS INDEX: 33.83 KG/M2 | HEART RATE: 71 BPM | HEIGHT: 61 IN

## 2024-06-28 DIAGNOSIS — Z78.0 ASYMPTOMATIC POSTMENOPAUSAL STATE: ICD-10-CM

## 2024-06-28 DIAGNOSIS — Z13.31 DEPRESSION SCREENING: ICD-10-CM

## 2024-06-28 DIAGNOSIS — Z00.00 HEALTH MAINTENANCE EXAMINATION: ICD-10-CM

## 2024-06-28 DIAGNOSIS — Z00.00 MEDICARE ANNUAL WELLNESS VISIT, SUBSEQUENT: Primary | ICD-10-CM

## 2024-06-28 DIAGNOSIS — I10 PRIMARY HYPERTENSION: ICD-10-CM

## 2024-06-28 RX ORDER — AMLODIPINE BESYLATE 2.5 MG/1
2.5 TABLET ORAL DAILY
Qty: 90 TABLET | Refills: 1 | Status: SHIPPED | OUTPATIENT
Start: 2024-06-28

## 2024-06-28 RX ORDER — LOSARTAN POTASSIUM 100 MG/1
100 TABLET ORAL DAILY
Qty: 90 TABLET | Refills: 1 | Status: SHIPPED | OUTPATIENT
Start: 2024-06-28

## 2024-06-28 RX ORDER — HYDROCHLOROTHIAZIDE 25 MG/1
25 TABLET ORAL DAILY
Qty: 90 TABLET | Refills: 1 | Status: SHIPPED | OUTPATIENT
Start: 2024-06-28

## 2024-06-28 ASSESSMENT — ENCOUNTER SYMPTOMS
HEMATURIA: 0
FREQUENCY: 0
LOSS OF SENSATION IN FEET: 0
HEADACHES: 0
NERVOUS/ANXIOUS: 0
SHORTNESS OF BREATH: 0
OCCASIONAL FEELINGS OF UNSTEADINESS: 1
COUGH: 0
PALPITATIONS: 0
DEPRESSION: 0
CONSTIPATION: 0
DYSPHORIC MOOD: 0
FATIGUE: 0
DIARRHEA: 0
NUMBNESS: 0
DYSURIA: 0
ABDOMINAL PAIN: 0
WHEEZING: 0
DIZZINESS: 0
FEVER: 0
NAUSEA: 0
CHILLS: 0

## 2024-06-28 ASSESSMENT — ACTIVITIES OF DAILY LIVING (ADL)
DRESSING: INDEPENDENT
BATHING: INDEPENDENT
DOING_HOUSEWORK: NEEDS ASSISTANCE
TAKING_MEDICATION: INDEPENDENT
MANAGING_FINANCES: NEEDS ASSISTANCE
GROCERY_SHOPPING: INDEPENDENT

## 2024-06-28 NOTE — PROGRESS NOTES
Subjective   Reason for Visit: Pooja Wong is an 84 y.o. female here for a Medicare Wellness visit.     Past Medical, Surgical, and Family History reviewed and updated in chart.    Reviewed all medications by prescribing practitioner or clinical pharmacist (such as prescriptions, OTCs, herbal therapies and supplements) and documented in the medical record.    HPI  Specialists seen by patient: Urology  - Ortho  -eye doctor  -dentist  -derm    Last pap/cervical cancer screening: n/a  Last mammogram:  n/a  Hx of colon ca screening: N/A  Hx of DXA:  will order  Immunizations:  needs shingrix, tdap, and prevnar 20    Diet: Follows a healthy diet  Exercise: Not much  Alcohol abuse screen:   none   How many times in the past year 4 or more drinks in a day? 0  Lung cancer screening:   Smoking history: never a smoker  Drug use: No  Depression screen yearly (phq-2): 0  Living will/healthcare power of :Yes - reviewed    Patient Self Assessment of Health Status  Patient Self Assessment: Good  Functional Ability/Level of Safety  Cognitive Impairment Observed: No cognitive impairment observed  [place] Home Safety Risk Factors  Home Safety Risk Factors: No grab bars, bathroom  Nutrition and Exercise  Current Diet: Well Balanced Diet  Adequate Fluid Intake: No  Caffeine: No  Exercise Frequency: No Exercise  ADL Screening  Hearing - Right Ear: Difficulty with noise  Hearing - Left Ear: Difficulty with noise  Bathing: Independent  Dressing: Independent  Walks in Home: Independent  IADL's  Managing Finances: Needs assistance  Grocery Shopping: Independent  Taking Medication: Independent  Doing Housework: Needs assistance      Follow-up of  hypertension. Current medications ARB, CCB, and Thiazide.  Home blood pressure readings: not doing. Medication compliance: taking as prescribed.       Patient Care Team:  Essie Bishop DO as PCP - General (Family Medicine)  Mahamed Maya MD as PCP - [employer] PCP  "    Review of Systems   Constitutional:  Negative for chills, fatigue and fever.   Respiratory:  Negative for cough, shortness of breath and wheezing.    Cardiovascular:  Negative for chest pain, palpitations and leg swelling.   Gastrointestinal:  Negative for abdominal pain, constipation, diarrhea and nausea.   Genitourinary:  Negative for dysuria, frequency, hematuria and urgency.   Neurological:  Negative for dizziness, numbness and headaches.   Psychiatric/Behavioral:  Negative for dysphoric mood. The patient is not nervous/anxious.        Objective   Vitals:  /70 (BP Location: Left arm, Patient Position: Sitting, BP Cuff Size: Large adult)   Pulse 71   Temp 36.9 °C (98.4 °F) (Skin)   Ht 1.537 m (5' 0.5\")   Wt 81.3 kg (179 lb 3.2 oz)   SpO2 93%   BMI 34.42 kg/m²       Physical Exam  Constitutional:       General: She is not in acute distress.     Appearance: Normal appearance.   HENT:      Head: Normocephalic and atraumatic.      Right Ear: Tympanic membrane and ear canal normal.      Left Ear: Tympanic membrane and ear canal normal.      Mouth/Throat:      Mouth: Mucous membranes are moist.      Pharynx: No posterior oropharyngeal erythema.   Eyes:      Extraocular Movements: Extraocular movements intact.      Pupils: Pupils are equal, round, and reactive to light.   Cardiovascular:      Rate and Rhythm: Normal rate and regular rhythm.      Heart sounds: No murmur heard.  Pulmonary:      Effort: Pulmonary effort is normal. No respiratory distress.      Breath sounds: Normal breath sounds. No wheezing.   Abdominal:      General: Bowel sounds are normal.      Palpations: Abdomen is soft.      Tenderness: There is no abdominal tenderness. There is no guarding.   Musculoskeletal:         General: Normal range of motion.      Cervical back: Neck supple.   Skin:     General: Skin is warm and dry.   Neurological:      General: No focal deficit present.      Mental Status: She is alert and oriented to " person, place, and time.   Psychiatric:         Mood and Affect: Mood normal.         Behavior: Behavior normal.         Assessment/Plan   Problem List Items Addressed This Visit       Primary hypertension    Relevant Medications    hydroCHLOROthiazide (HYDRODiuril) 25 mg tablet    losartan (Cozaar) 100 mg tablet    amLODIPine (Norvasc) 2.5 mg tablet    Other Relevant Orders    Lipid Panel    Comprehensive Metabolic Panel    CBC     Other Visit Diagnoses       Asymptomatic postmenopausal state    -  Primary    Relevant Orders    XR DEXA bone density          We will obtain fasting blood work.  Results will be communicated to the patient via MyChart or a letter.   We reviewed appropriate preventative health screening guidelines.   We discussed regular aerobic exercise. We discussed proper nutrition and weight control.    Will have her monitor her blood pressure at home and follow-up in 3 months.  Can consider increasing her amlodipine to 5 mg

## 2024-07-01 ENCOUNTER — TELEPHONE (OUTPATIENT)
Dept: PRIMARY CARE | Facility: CLINIC | Age: 84
End: 2024-07-01

## 2024-07-01 ENCOUNTER — LAB (OUTPATIENT)
Dept: LAB | Facility: LAB | Age: 84
End: 2024-07-01
Payer: MEDICARE

## 2024-07-01 DIAGNOSIS — I10 PRIMARY HYPERTENSION: ICD-10-CM

## 2024-07-01 LAB
ALBUMIN SERPL BCP-MCNC: 3.7 G/DL (ref 3.4–5)
ALP SERPL-CCNC: 60 U/L (ref 33–136)
ALT SERPL W P-5'-P-CCNC: 14 U/L (ref 7–45)
ANION GAP SERPL CALC-SCNC: 12 MMOL/L (ref 10–20)
AST SERPL W P-5'-P-CCNC: 14 U/L (ref 9–39)
BILIRUB SERPL-MCNC: 0.4 MG/DL (ref 0–1.2)
BUN SERPL-MCNC: 21 MG/DL (ref 6–23)
CALCIUM SERPL-MCNC: 9.3 MG/DL (ref 8.6–10.3)
CHLORIDE SERPL-SCNC: 105 MMOL/L (ref 98–107)
CHOLEST SERPL-MCNC: 207 MG/DL (ref 0–199)
CHOLESTEROL/HDL RATIO: 4.1
CO2 SERPL-SCNC: 27 MMOL/L (ref 21–32)
CREAT SERPL-MCNC: 0.83 MG/DL (ref 0.5–1.05)
EGFRCR SERPLBLD CKD-EPI 2021: 70 ML/MIN/1.73M*2
ERYTHROCYTE [DISTWIDTH] IN BLOOD BY AUTOMATED COUNT: 14 % (ref 11.5–14.5)
GLUCOSE SERPL-MCNC: 96 MG/DL (ref 74–99)
HCT VFR BLD AUTO: 37.9 % (ref 36–46)
HDLC SERPL-MCNC: 50.3 MG/DL
HGB BLD-MCNC: 12.6 G/DL (ref 12–16)
LDLC SERPL CALC-MCNC: 102 MG/DL
MCH RBC QN AUTO: 29.3 PG (ref 26–34)
MCHC RBC AUTO-ENTMCNC: 33.2 G/DL (ref 32–36)
MCV RBC AUTO: 88 FL (ref 80–100)
NON HDL CHOLESTEROL: 157 MG/DL (ref 0–149)
NRBC BLD-RTO: 0 /100 WBCS (ref 0–0)
PLATELET # BLD AUTO: 410 X10*3/UL (ref 150–450)
POTASSIUM SERPL-SCNC: 4.2 MMOL/L (ref 3.5–5.3)
PROT SERPL-MCNC: 6.3 G/DL (ref 6.4–8.2)
RBC # BLD AUTO: 4.3 X10*6/UL (ref 4–5.2)
SODIUM SERPL-SCNC: 140 MMOL/L (ref 136–145)
TRIGL SERPL-MCNC: 276 MG/DL (ref 0–149)
VLDL: 55 MG/DL (ref 0–40)
WBC # BLD AUTO: 9.4 X10*3/UL (ref 4.4–11.3)

## 2024-07-01 PROCEDURE — 36415 COLL VENOUS BLD VENIPUNCTURE: CPT

## 2024-07-01 PROCEDURE — 80061 LIPID PANEL: CPT

## 2024-07-01 PROCEDURE — 80053 COMPREHEN METABOLIC PANEL: CPT

## 2024-07-01 PROCEDURE — 85027 COMPLETE CBC AUTOMATED: CPT

## 2024-07-01 NOTE — TELEPHONE ENCOUNTER
Maria M from [facility] central scheduling called and said they were trying to schedule patient for bone density and needed the referral/order faxed over to them  Fax [phone]  Phone [phone]    Printed and faxed referral to fax number provided.  Awaiting confirmation.

## 2024-07-08 ENCOUNTER — TELEPHONE (OUTPATIENT)
Dept: PRIMARY CARE | Facility: CLINIC | Age: 84
End: 2024-07-08
Payer: MEDICARE

## 2024-07-08 NOTE — TELEPHONE ENCOUNTER
----- Message from Essie Bishop DO sent at 7/8/2024 11:08 AM EDT -----  Please of the patient know that her blood work looks okay.  Her triglycerides are quite elevated and I see that she is not on any cholesterol medication.  My recommendation would be to keep an eye on carbohydrate intake and try to follow a low-fat and low carbohydrate diet to the best of her ability

## 2024-07-08 NOTE — TELEPHONE ENCOUNTER
Called and spoke to patient. Let her know the results of the blood work.   Only had one question. Do you think it advisable to talk to a nutritionist.   Please advise.

## 2024-07-11 DIAGNOSIS — E78.1 HYPERTRIGLYCERIDEMIA: ICD-10-CM

## 2024-07-11 NOTE — TELEPHONE ENCOUNTER
Called spoke to patient. After relaying the message from dr. Bishop, she was wanting the referral placed for a dietitian   Referral placed and gave pt number so she could call and schedule on Friday.

## 2024-09-03 ENCOUNTER — APPOINTMENT (OUTPATIENT)
Dept: UROLOGY | Facility: CLINIC | Age: 84
End: 2024-09-03
Payer: MEDICARE

## 2024-09-03 VITALS
BODY MASS INDEX: 35.61 KG/M2 | WEIGHT: 185.4 LBS | DIASTOLIC BLOOD PRESSURE: 85 MMHG | SYSTOLIC BLOOD PRESSURE: 197 MMHG | HEART RATE: 74 BPM

## 2024-09-03 DIAGNOSIS — N39.41 URGE URINARY INCONTINENCE: ICD-10-CM

## 2024-09-03 DIAGNOSIS — N81.89 PELVIC FLOOR WEAKNESS: ICD-10-CM

## 2024-09-03 DIAGNOSIS — Z96.0 PRESENCE OF PESSARY: Primary | ICD-10-CM

## 2024-09-03 DIAGNOSIS — N95.2 VAGINAL ATROPHY: ICD-10-CM

## 2024-09-03 PROCEDURE — 99213 OFFICE O/P EST LOW 20 MIN: CPT | Performed by: OBSTETRICS & GYNECOLOGY

## 2024-09-03 PROCEDURE — 1160F RVW MEDS BY RX/DR IN RCRD: CPT | Performed by: OBSTETRICS & GYNECOLOGY

## 2024-09-03 PROCEDURE — 1159F MED LIST DOCD IN RCRD: CPT | Performed by: OBSTETRICS & GYNECOLOGY

## 2024-09-03 PROCEDURE — G2211 COMPLEX E/M VISIT ADD ON: HCPCS | Performed by: OBSTETRICS & GYNECOLOGY

## 2024-09-03 PROCEDURE — 1036F TOBACCO NON-USER: CPT | Performed by: OBSTETRICS & GYNECOLOGY

## 2024-09-03 PROCEDURE — 3079F DIAST BP 80-89 MM HG: CPT | Performed by: OBSTETRICS & GYNECOLOGY

## 2024-09-03 PROCEDURE — 1123F ACP DISCUSS/DSCN MKR DOCD: CPT | Performed by: OBSTETRICS & GYNECOLOGY

## 2024-09-03 PROCEDURE — 3077F SYST BP >= 140 MM HG: CPT | Performed by: OBSTETRICS & GYNECOLOGY

## 2024-09-03 RX ORDER — ESTRADIOL 0.1 MG/G
CREAM VAGINAL
Qty: 42.5 G | Refills: 3 | Status: SHIPPED | OUTPATIENT
Start: 2024-09-03

## 2024-09-03 RX ORDER — ESTRADIOL 0.1 MG/G
CREAM VAGINAL
Qty: 42.5 G | Refills: 3 | Status: SHIPPED | OUTPATIENT
Start: 2024-09-03 | End: 2024-09-03 | Stop reason: SDUPTHER

## 2024-09-03 NOTE — PATIENT INSTRUCTIONS
Please continue your vaginal estrogen therapy 3 times a week moving forward.  Use a pea-sized or dime size amount vaginally.  Do not use the plastic applicator.    Please follow-up in January 2024 for pessary maintenance.    Please contact the clinic with any questions or concerns.    [phone]

## 2024-09-03 NOTE — PROGRESS NOTES
Subjective   Patient ID: Pooja Wong is a 84 y.o. female who presents for pessary maintenance.  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    The patient continues to be  satisfied with the pessary.She is utilizing the vaginal estrogen cream when she remembers. She denies any abnormal vaginal bleeding or discharge.     She continues to note urinary frequency and urgency with episodes of urgency. She utilizes a thin pads to avoid accidents.  All her voids are small volume and she has the urgency to void. She void 6-8 times during the day and is not bothered by this     She denies any bowel related complaints, no fecal or flatal incontinence.     She has no other complaints.    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    The patient continues to be  satisfied with the pessary, however she notes pressure around the pessary. She is utilizing the vaginal estrogen cream when she remembers. She denies any abnormal vaginal bleeding or discharge.     She continues to note urinary frequency and urgency with episodes of urgency. She utilizes a thin pads to avoid accidents.  All her voids are small volume and she has the urgency to void. She void 6-8 times during the day and is not bothered by this     She denies any bowel related complaints, no fecal or flatal incontinence.     She has no other complaints.    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    The patient continues to be  satisfied with the pessary, however she notes pressure around the pessary. She is not utilizing the vaginal estrogen cream thrice weekly. She denies any abnormal vaginal bleeding or discharge.     She continues to note urinary frequency and urgency with episodes of urgency. She utilizes a thin pads to avoid accidents.  All her voids are small volume and she has the urgency to void. She void 6-8 times during the day and is not bothered by this     She denies any bowel related complaints, no fecal or flatal incontinence.     She has no other complaints.    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     The patient is satisfied with the pessary, however she notes pressure around the pessary. She is also noting vaginal irritation and itching.She is not utilizing the vaginal estrogen cream thrice weekly. She denies any abnormal vaginal bleeding or discharge.     She continues to note urinary frequency and urgency. All her voids are small volume and she has the urgency to void. She void 6-8 times during the day and is not bothered by this     She denies any bowel related complaints, no fecal or flatal incontinence.     She has no other complaints.        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     The patient continues to note urinary frequency and urgency. All her voids are small volume and she has the urgency to void. She void 6-8  times during the day and is not bothered by this. She does not wish to try any medications as of today.     She is satisfied from the pessary standpoint, she denies any bulge complaints. She continues to utilize the vaginal estrogen cream thrice weekly. She denies any vaginal complaints, no abnormal vaginal bleeding or discharge.     She denies any bowel related complaints, no fecal or flatal incontinence.     She has no other complaints.           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     The patient presents today for pessary fitting, she denies any new complaints. She continues to utilize the vaginal estrogen cream.     She complaints of constipation but denies any fecal or flatal incontinence.      She has no other complaints.     From previous note   82- year-old patient presenting as a referral from Dr. Garcia with complaints of urinary urgency frequency and pelvic organ prolapse.     The patient complaints of urinary urgency and frequency, she voids every 2-3 hours during the day and notices 3-4 episodes of nocturia. She states she can hardly make it tot he bathroom without leaking in the morning. She denies any enuresis. She denies leaking on laughing, cough or sneezing. She has noticed UTI like symptoms in the past and utilizes Alfalfa with improvement in her symptoms. She denies any history of nephrolithiasis, gross hematuria or chronic recurrent UTIs.       She complaints of noticing bulge for the past several months which has gradually worsened. She has tried doing Kegels with no improvement in her symptoms.She is sexually active. She complaints of episodic bleeding and irritation and vaginal burning when she voids. She denies any abnormal discharge. She has a history of complete hysterectomy approximately 40 years ago.     She denies  any bowel related complaints, no fecal or flatal incontinence.     She has no other complaints.          Review of Systems  Constitutional: No fever, No chills and No fatigue.   Eyes: No vision problems and No dryness of the eyes.   ENT: No dry mouth, No hearing loss and No nosebleeds.   Cardiovascular: No chest pain, No palpitations and No orthopnea.   Respiratory: No shortness of breath, No cough and No wheezing.   Gastrointestinal: No abdominal pain, No constipation, No nausea, No diarrhea, No vomiting and No melena.   Genitourinary: As noted in HPI.   Musculoskeletal: No back pain, No myalgias, No muscle weakness, No joint swelling and No leg edema.   Integumentary: No rashes, No skin lesion and No itching.   Neurological: No headache, No numbness and No dizziness.   Psychiatric: No sleep disturbances, No anxiety and No depression.   Endocrine: No hot flashes, No loss of hair and No hirsutism.   Hematologic/Lymphatic: No swollen glands, No tendency for easy bleeding and No tendency for easy bruising.   All other systems have been reviewed and are negative for complaint.        Objective   Physical Exam  Patient ID: Pooja Wong is a 84 y.o. female.    Procedures  After the area was prepped with iodine, ring forceps were utilized to remove the 2.5 inch Gellhorn short stem.  There was no evidence of excoriations.  The pessary was cleaned and replaced without difficulty.    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     #1 we discussed the patient's prolapse complaints. We discussed expectant management, pelvic floor physical therapy, pessary management, and surgical options. She was successfully fitted with a 2.5 inch Gellhorn short stem pessary 2/21/2023. She does have a  foreshortened vagina and we discussed her unique anatomy may not be successful for pessary placement. She however appears to be having excellent results with her pessary. She will continue every 4-5 month follow-up.     2. We discussed the patient's urge urinary incontinence complaints. We again discussed at length today the AUA OAB care pathway including first, second, third line therapies. She is not interested in therapy at this time.     3. We again discussed the safety, efficacy, proper utilization of vaginal estrogen therapy. She will restart this 3 times a week moving forward.     4. The patient will follow-up in January 2025 for pessary maintenance.     LYNDSAY Lay MD     Scribe Attestation  By signing my name below, I, Fior Holcomb attest that this documentation has been prepared under the direction and in the presence of Reggie Lay MD. All medical record entries made by the Scribe were at my direction or personally dictated by me. I have reviewed the chart and agree that the record accurately reflects my personal performance of the history, physical exam, discussion and plan.

## 2024-09-20 ENCOUNTER — PATIENT OUTREACH (OUTPATIENT)
Dept: CARE COORDINATION | Facility: CLINIC | Age: 84
End: 2024-09-20
Payer: MEDICARE

## 2024-09-20 NOTE — PROGRESS NOTES
"Spoke with patient regarding referral for RD services; patient declined due to length of time for call; feels she \"has worked through her issue\".  Referral closed at this time.  "

## 2024-10-02 ENCOUNTER — APPOINTMENT (OUTPATIENT)
Dept: PRIMARY CARE | Facility: CLINIC | Age: 84
End: 2024-10-02
Payer: MEDICARE

## 2024-11-04 ENCOUNTER — APPOINTMENT (OUTPATIENT)
Dept: PRIMARY CARE | Facility: CLINIC | Age: 84
End: 2024-11-04
Payer: MEDICARE

## 2024-11-04 VITALS
DIASTOLIC BLOOD PRESSURE: 90 MMHG | HEART RATE: 66 BPM | SYSTOLIC BLOOD PRESSURE: 154 MMHG | OXYGEN SATURATION: 95 % | TEMPERATURE: 97.8 F

## 2024-11-04 DIAGNOSIS — I10 PRIMARY HYPERTENSION: ICD-10-CM

## 2024-11-04 PROCEDURE — 3080F DIAST BP >= 90 MM HG: CPT | Performed by: STUDENT IN AN ORGANIZED HEALTH CARE EDUCATION/TRAINING PROGRAM

## 2024-11-04 PROCEDURE — 1036F TOBACCO NON-USER: CPT | Performed by: STUDENT IN AN ORGANIZED HEALTH CARE EDUCATION/TRAINING PROGRAM

## 2024-11-04 PROCEDURE — 1159F MED LIST DOCD IN RCRD: CPT | Performed by: STUDENT IN AN ORGANIZED HEALTH CARE EDUCATION/TRAINING PROGRAM

## 2024-11-04 PROCEDURE — 3077F SYST BP >= 140 MM HG: CPT | Performed by: STUDENT IN AN ORGANIZED HEALTH CARE EDUCATION/TRAINING PROGRAM

## 2024-11-04 PROCEDURE — 1160F RVW MEDS BY RX/DR IN RCRD: CPT | Performed by: STUDENT IN AN ORGANIZED HEALTH CARE EDUCATION/TRAINING PROGRAM

## 2024-11-04 PROCEDURE — 1123F ACP DISCUSS/DSCN MKR DOCD: CPT | Performed by: STUDENT IN AN ORGANIZED HEALTH CARE EDUCATION/TRAINING PROGRAM

## 2024-11-04 PROCEDURE — 99214 OFFICE O/P EST MOD 30 MIN: CPT | Performed by: STUDENT IN AN ORGANIZED HEALTH CARE EDUCATION/TRAINING PROGRAM

## 2024-11-04 PROCEDURE — G2211 COMPLEX E/M VISIT ADD ON: HCPCS | Performed by: STUDENT IN AN ORGANIZED HEALTH CARE EDUCATION/TRAINING PROGRAM

## 2024-11-04 RX ORDER — AMLODIPINE BESYLATE 5 MG/1
5 TABLET ORAL DAILY
Qty: 90 TABLET | Refills: 1 | Status: SHIPPED | OUTPATIENT
Start: 2024-11-04

## 2024-11-04 RX ORDER — LOSARTAN POTASSIUM 100 MG/1
100 TABLET ORAL DAILY
Qty: 90 TABLET | Refills: 1 | Status: SHIPPED | OUTPATIENT
Start: 2024-11-04

## 2024-11-04 RX ORDER — HYDROCHLOROTHIAZIDE 25 MG/1
25 TABLET ORAL DAILY
Qty: 90 TABLET | Refills: 1 | Status: SHIPPED | OUTPATIENT
Start: 2024-11-04

## 2024-11-04 ASSESSMENT — ENCOUNTER SYMPTOMS
COUGH: 0
FREQUENCY: 0
NERVOUS/ANXIOUS: 0
DYSPHORIC MOOD: 0
NAUSEA: 0
DIZZINESS: 0
WHEEZING: 0
NUMBNESS: 0
OCCASIONAL FEELINGS OF UNSTEADINESS: 0
CONSTIPATION: 0
ABDOMINAL PAIN: 0
FEVER: 0
SHORTNESS OF BREATH: 0
CHILLS: 0
DIARRHEA: 0
FATIGUE: 0
HEADACHES: 0
PALPITATIONS: 0
DYSURIA: 0
DEPRESSION: 0
HEMATURIA: 0

## 2024-11-04 ASSESSMENT — PATIENT HEALTH QUESTIONNAIRE - PHQ9
SUM OF ALL RESPONSES TO PHQ9 QUESTIONS 1 AND 2: 0
2. FEELING DOWN, DEPRESSED OR HOPELESS: NOT AT ALL
1. LITTLE INTEREST OR PLEASURE IN DOING THINGS: NOT AT ALL

## 2024-11-04 NOTE — PROGRESS NOTES
Subjective   Patient ID: Pooja Wong is a 84 y.o. female who presents for Follow-up (Blood pressure) and Flu Vaccine (declined).    HPI     Follow-up of  hypertension. Current medications ARB, CCB, and Thiazide. Home blood pressure readings:  doesn't take it much b/c bp cuff doesn't always work . Salt intake and diet: Caffeine: none, Salt added to food: no, Salt added to cooking: no, Salty foods:sometimes, Fast food: once to twice a week      Review of Systems   Constitutional:  Negative for chills, fatigue and fever.   Respiratory:  Negative for cough, shortness of breath and wheezing.    Cardiovascular:  Negative for chest pain, palpitations and leg swelling.   Gastrointestinal:  Negative for abdominal pain, constipation, diarrhea and nausea.   Genitourinary:  Negative for dysuria, frequency, hematuria and urgency.   Neurological:  Negative for dizziness, numbness and headaches.   Psychiatric/Behavioral:  Negative for dysphoric mood. The patient is not nervous/anxious.        Objective   /90 (BP Location: Left arm, Patient Position: Sitting, BP Cuff Size: Large adult)   Pulse 66   Temp 36.6 °C (97.8 °F) (Temporal)   SpO2 95%     Physical Exam  Constitutional:       Appearance: Normal appearance.   HENT:      Head: Normocephalic and atraumatic.   Eyes:      Extraocular Movements: Extraocular movements intact.      Pupils: Pupils are equal, round, and reactive to light.   Cardiovascular:      Rate and Rhythm: Normal rate and regular rhythm.      Heart sounds: Normal heart sounds. No murmur heard.  Pulmonary:      Effort: Pulmonary effort is normal.      Breath sounds: Normal breath sounds. No wheezing.   Musculoskeletal:         General: Normal range of motion.   Skin:     General: Skin is warm and dry.   Neurological:      General: No focal deficit present.      Mental Status: She is alert and oriented to person, place, and time.   Psychiatric:         Mood and Affect: Mood normal.         Behavior:  Behavior normal.         Assessment/Plan   Problem List Items Addressed This Visit       Primary hypertension    Relevant Medications    amLODIPine (Norvasc) 5 mg tablet    hydroCHLOROthiazide (HYDRODiuril) 25 mg tablet    losartan (Cozaar) 100 mg tablet     Blood pressure still elevated.  Will increase amlodipine to 5 mg and see her back in a few months.  Discussed the proper way to check blood pressure including keeping her arm at heart level and resting it on something.    Essie Bishop, DO  11/4/2024

## 2024-11-22 NOTE — PROGRESS NOTES
Subjective   Patient ID: Pooja Wong is a 84 y.o. female who presents for pessary maintenance.  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    The patient continues to be  satisfied with the pessary.She is utilizing the vaginal estrogen cream when she remembers. She denies any abnormal vaginal bleeding or discharge.     She continues to note urinary frequency and urgency with episodes of urgency. She utilizes a thin pads to avoid accidents.  All her voids are small volume and she has the urgency to void. She void 6-8 times during the day and is not bothered by this     She denies any bowel related complaints, no fecal or flatal incontinence.     She has no other complaints.    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    The patient continues to be  satisfied with the pessary, however she notes pressure around the pessary. She is utilizing the vaginal estrogen cream when she remembers. She denies any abnormal vaginal bleeding or discharge.     She continues to note urinary frequency and urgency with episodes of urgency. She utilizes a thin pads to avoid accidents.  All her voids are small volume and she has the urgency to void. She void 6-8 times during the day and is not bothered by this     She  denies any bowel related complaints, no fecal or flatal incontinence.     She has no other complaints.    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    The patient continues to be  satisfied with the pessary, however she notes pressure around the pessary. She is not utilizing the vaginal estrogen cream thrice weekly. She denies any abnormal vaginal bleeding or discharge.     She continues to note urinary frequency and urgency with episodes of urgency. She utilizes a thin pads to avoid accidents.  All her voids are small volume and she has the urgency to void. She void 6-8 times during the day and is not bothered by this     She denies any bowel related complaints, no fecal or flatal incontinence.     She has no other complaints.    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     The patient is satisfied with the pessary, however she notes pressure around the pessary. She is also noting vaginal irritation and itching.She is not utilizing the vaginal estrogen cream thrice weekly. She denies any abnormal vaginal bleeding or discharge.     She continues to note urinary frequency and urgency. All her voids are small volume and she has the urgency to void. She void 6-8 times during the day and is not bothered by this     She denies any bowel related complaints, no fecal or flatal incontinence.     She has no other complaints.        From previous note  82-year-old with stage II anterior and apical post hysterectomy prolapse, urge urinary incontinence, postmenopausal  bleeding associated with vaginal excoriation, and pelvic floor weakness presenting for 2.5 inch Gellhorn short stem placement 2/21/2023.     The patient continues to note urinary frequency and urgency. All her voids are small volume and she has the urgency to void. She void 6-8 times during the day and is not bothered by this. She does not wish to try any medications as of today.     She is satisfied from the pessary standpoint, she denies any bulge complaints. She continues to utilize the vaginal estrogen cream thrice weekly. She denies any vaginal complaints, no abnormal vaginal bleeding or discharge.     She denies any bowel related complaints, no fecal or flatal incontinence.     She has no other complaints.           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     The patient presents today for pessary fitting, she denies any new complaints. She continues to utilize the vaginal estrogen cream.     She complaints of constipation but denies any fecal or flatal incontinence.      She has no other complaints.     From previous note   82- year-old patient presenting as a referral from Dr. Garcia with complaints of urinary urgency frequency and pelvic organ prolapse.     The patient complaints of urinary urgency and frequency, she voids every 2-3 hours during the day and notices 3-4 episodes of nocturia. She states she can hardly make it tot he bathroom without leaking in the morning. She denies any enuresis. She denies leaking on laughing, cough or sneezing. She has noticed UTI like symptoms in the past and utilizes Alfalfa with improvement in her symptoms. She denies any history of nephrolithiasis, gross hematuria or chronic recurrent UTIs.       She complaints of noticing bulge for the past several months which has gradually worsened. She has tried  doing Kegels with no improvement in her symptoms.She is sexually active. She complaints of episodic bleeding and irritation and vaginal burning when she voids. She denies any abnormal discharge. She has a history of complete hysterectomy approximately 40 years ago.     She denies any bowel related complaints, no fecal or flatal incontinence.     She has no other complaints.          Review of Systems  Constitutional: No fever, No chills and No fatigue.   Eyes: No vision problems and No dryness of the eyes.   ENT: No dry mouth, No hearing loss and No nosebleeds.   Cardiovascular: No chest pain, No palpitations and No orthopnea.   Respiratory: No shortness of breath, No cough and No wheezing.   Gastrointestinal: No abdominal pain, No constipation, No nausea, No diarrhea, No vomiting and No melena.   Genitourinary: As noted in HPI.   Musculoskeletal: No back pain, No myalgias, No muscle weakness, No joint swelling and No leg edema.   Integumentary: No rashes, No skin lesion and No itching.   Neurological: No headache, No numbness and No dizziness.   Psychiatric: No sleep disturbances, No anxiety and No depression.   Endocrine: No hot flashes, No loss of hair and No hirsutism.   Hematologic/Lymphatic: No swollen glands, No tendency for easy bleeding and No tendency for easy bruising.   All other systems have been reviewed and are negative for complaint.        Objective   Physical Exam  Patient ID: Pooja Wong is a 84 y.o. female.    Procedures  After the area was prepped with iodine, ring forceps were utilized to remove the 2.5 inch Gellhorn short stem.  There was no evidence of excoriations.  The pessary was cleaned and replaced without difficulty.    Assessment/Plan      84-year-old with stage II anterior and apical post hysterectomy prolapse, urge urinary incontinence, postmenopausal bleeding associated with vaginal excoriation, and pelvic floor weakness presenting for 2.5 inch Gellhorn short stem placement  2/21/2023.     #1 we discussed the patient's prolapse complaints. We discussed expectant management, pelvic floor physical therapy, pessary management, and surgical options. She was successfully fitted with a 2.5 inch Gellhorn short stem pessary 2/21/2023. 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 She however appears to be having excellent results with her pessary. She will continue every 4-5 month follow-up.     2. We discussed the patient's urge urinary incontinence complaints. We again discussed at length today the [facility] care pathway including first, second, third line therapies. She is not interested in therapy at this time.     3. We again discussed the safety, efficacy, proper utilization of vaginal estrogen therapy. She will restart this 3 times a week moving forward.     4. The patient will follow-up in January 2025 for pessary maintenance.     LYNDSAY Lay MD     Scribe Attestation  By signing my name below, I, Fior Holcomb attest that this documentation has been prepared under the direction and in the presence of Reggie Lay MD. All medical record entries made by the Scribe were at my direction or personally dictated by me. I have reviewed the chart and agree that the record accurately reflects my personal performance of the history, physical exam, discussion and plan.

## 2024-12-03 ENCOUNTER — APPOINTMENT (OUTPATIENT)
Dept: UROLOGY | Facility: CLINIC | Age: 84
End: 2024-12-03
Payer: MEDICARE

## 2024-12-17 ENCOUNTER — APPOINTMENT (OUTPATIENT)
Dept: UROLOGY | Facility: CLINIC | Age: 84
End: 2024-12-17
Payer: MEDICARE

## 2024-12-17 DIAGNOSIS — N95.2 VAGINAL ATROPHY: ICD-10-CM

## 2024-12-17 DIAGNOSIS — N81.89 PELVIC FLOOR WEAKNESS: ICD-10-CM

## 2024-12-17 DIAGNOSIS — N39.41 URGE URINARY INCONTINENCE: ICD-10-CM

## 2024-12-17 DIAGNOSIS — Z96.0 PRESENCE OF PESSARY: Primary | ICD-10-CM

## 2024-12-17 PROCEDURE — 99213 OFFICE O/P EST LOW 20 MIN: CPT | Performed by: OBSTETRICS & GYNECOLOGY

## 2024-12-17 PROCEDURE — 1036F TOBACCO NON-USER: CPT | Performed by: OBSTETRICS & GYNECOLOGY

## 2024-12-17 PROCEDURE — 1159F MED LIST DOCD IN RCRD: CPT | Performed by: OBSTETRICS & GYNECOLOGY

## 2024-12-17 PROCEDURE — G2211 COMPLEX E/M VISIT ADD ON: HCPCS | Performed by: OBSTETRICS & GYNECOLOGY

## 2024-12-17 PROCEDURE — 1123F ACP DISCUSS/DSCN MKR DOCD: CPT | Performed by: OBSTETRICS & GYNECOLOGY

## 2024-12-17 PROCEDURE — 1160F RVW MEDS BY RX/DR IN RCRD: CPT | Performed by: OBSTETRICS & GYNECOLOGY

## 2024-12-17 RX ORDER — ESTRADIOL 0.1 MG/G
CREAM VAGINAL
Qty: 42.5 G | Refills: 3 | Status: SHIPPED | OUTPATIENT
Start: 2024-12-17

## 2024-12-17 RX ORDER — ESTRADIOL 0.1 MG/G
CREAM VAGINAL
Qty: 42.5 G | Refills: 3 | Status: SHIPPED | OUTPATIENT
Start: 2024-12-17 | End: 2024-12-17 | Stop reason: SDUPTHER

## 2024-12-17 NOTE — PATIENT INSTRUCTIONS
Continue using  vaginal estrogen cream 3 times weekly.    Call the clinic with questions or concerns.    [phone]

## 2024-12-17 NOTE — PROGRESS NOTES
Subjective   Patient ID: Pooja Wong is a 84 y.o. female who presents for pessary maintenance.  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    She feels her pessary keeps coming out, however this is not uncomfortable. She does feels some pressure. She denies any abnormal vaginal bleeding or discharge.    She notes having urinary urgency, voids every 2-3 hours. She feels night symptoms does bother her. She denies any UTI symptoms.    She denies any bowel related complaints, no fecal or flatal incontinence.     She has no other complaints.    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    The patient continues to be  satisfied with the pessary.She is utilizing the vaginal estrogen cream when she remembers. She denies any abnormal vaginal bleeding or discharge.     She continues to note urinary frequency and urgency with episodes of urgency. She utilizes a thin pads to avoid accidents.  All her voids are small volume and she has the urgency to void. She void 6-8 times during the day and is not bothered by this     She denies any bowel related complaints, no fecal or flatal incontinence.     She has no other complaints.    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    The patient continues to be  satisfied with the pessary, however she notes pressure  around the pessary. She is utilizing the vaginal estrogen cream when she remembers. She denies any abnormal vaginal bleeding or discharge.     She continues to note urinary frequency and urgency with episodes of urgency. She utilizes a thin pads to avoid accidents.  All her voids are small volume and she has the urgency to void. She void 6-8 times during the day and is not bothered by this     She denies any bowel related complaints, no fecal or flatal incontinence.     She has no other complaints.    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    The patient continues to be  satisfied with the pessary, however she notes pressure around the pessary. She is not utilizing the vaginal estrogen cream thrice weekly. She denies any abnormal vaginal bleeding or discharge.     She continues to note urinary frequency and urgency with episodes of urgency. She utilizes a thin pads to avoid accidents.  All her voids are small volume and she has the urgency to void. She void 6-8 times during the day and is not bothered by this     She denies any bowel related complaints, no fecal or flatal incontinence.     She has no other complaints.    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     The patient is satisfied with the pessary, however she notes pressure around the pessary. She is also noting vaginal irritation and itching.She is not utilizing the vaginal estrogen cream thrice weekly. She denies any abnormal vaginal bleeding or discharge.     She continues to note urinary  frequency and urgency. All her voids are small volume and she has the urgency to void. She void 6-8 times during the day and is not bothered by this     She denies any bowel related complaints, no fecal or flatal incontinence.     She has no other complaints.        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     The patient continues to note urinary frequency and urgency. All her voids are small volume and she has the urgency to void. She void 6-8 times during the day and is not bothered by this. She does not wish to try any medications as of today.     She is satisfied from the pessary standpoint, she denies any bulge complaints. She continues to utilize the vaginal estrogen cream thrice weekly. She denies any vaginal complaints, no abnormal vaginal bleeding or discharge.     She denies any bowel related complaints, no fecal or flatal incontinence.     She has no other complaints.           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     The patient presents today for pessary fitting, she denies any new complaints. She continues to utilize the vaginal estrogen cream.     She complaints of constipation but denies any fecal or flatal incontinence.      She has no other complaints.     From previous note   82- year-old patient presenting as a referral from Dr. Garcia with complaints of urinary urgency frequency and pelvic organ prolapse.     The patient complaints of urinary urgency and frequency, she voids every 2-3 hours during the day and notices 3-4 episodes of nocturia. She states she can hardly make it tot he bathroom  without leaking in the morning. She denies any enuresis. She denies leaking on laughing, cough or sneezing. She has noticed UTI like symptoms in the past and utilizes Alfalfa with improvement in her symptoms. She denies any history of nephrolithiasis, gross hematuria or chronic recurrent UTIs.       She complaints of noticing bulge for the past several months which has gradually worsened. She has tried doing Kegels with no improvement in her symptoms.She is sexually active. She complaints of episodic bleeding and irritation and vaginal burning when she voids. She denies any abnormal discharge. She has a history of complete hysterectomy approximately 40 years ago.     She denies any bowel related complaints, no fecal or flatal incontinence.     She has no other complaints.          Review of Systems  Constitutional: No fever, No chills and No fatigue.   Eyes: No vision problems and No dryness of the eyes.   ENT: No dry mouth, No hearing loss and No nosebleeds.   Cardiovascular: No chest pain, No palpitations and No orthopnea.   Respiratory: No shortness of breath, No cough and No wheezing.   Gastrointestinal: No abdominal pain, No constipation, No nausea, No diarrhea, No vomiting and No melena.   Genitourinary: As noted in HPI.   Musculoskeletal: No back pain, No myalgias, No muscle weakness, No joint swelling and No leg edema.   Integumentary: No rashes, No skin lesion and No itching.   Neurological: No headache, No numbness and No dizziness.   Psychiatric: No sleep disturbances, No anxiety and No depression.   Endocrine: No hot flashes, No loss of hair and No hirsutism.   Hematologic/Lymphatic: No swollen glands, No tendency for easy bleeding and No tendency for easy bruising.   All other systems have been reviewed and are negative for complaint.        Objective   Physical Exam  Patient ID: Pooja Wong is a 84 y.o. female.    Procedures  After the area was prepped with lidocaine, ring forceps were utilized to  remove the 2.5 inch Gellhorn short stem.  There was no evidence of excoriations.  The pessary was cleaned and replaced without difficulty.    Assessment/Plan      84-year-old with stage II anterior and apical post hysterectomy prolapse, urge urinary incontinence, postmenopausal bleeding associated with vaginal excoriation, and pelvic floor weakness presenting for 2.5 inch Gellhorn short stem placement 2/21/2023.     #1 we discussed the patient's prolapse complaints. We discussed expectant management, pelvic floor physical therapy, pessary management, and surgical options. She was successfully fitted with a 2.5 inch Gellhorn short stem pessary 2/21/2023. 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 She however appears to be having excellent results with her pessary. She will continue every 4-5 month follow-up. She may benefit from a 2.5 inch Shatz pessary moving forwards.     2. We discussed the patient's urge urinary incontinence complaints. We again discussed at length today the [facility] care pathway including first, second, third line therapies. She is not interested in therapy at this time.     3. We again discussed the safety, efficacy, proper utilization of vaginal estrogen therapy. She will restart this 3 times a week moving forward.     4. The patient will follow-up in April 2025 for pessary maintenance.     LYNDSAY Lay MD     Scribe Attestation  By signing my name below, IKate Scribe attest that this documentation has been prepared under the direction and in the presence of Reggie Lay MD. All medical record entries made by the Scribe were at my direction or personally dictated by me. I have reviewed the chart and agree that the record accurately reflects my personal performance of the history, physical exam, discussion and plan.

## 2024-12-19 DIAGNOSIS — M15.0 PRIMARY OSTEOARTHRITIS INVOLVING MULTIPLE JOINTS: ICD-10-CM

## 2024-12-19 NOTE — TELEPHONE ENCOUNTER
REFILL SENT TO DR YOON TO REVIEW AND SEND FOR   LIDOCAINE 5% PATH APPLY 1 FOR 12 HOURS THEN OFF FOR 12 HOURS  APT SET FOR 2/10/25    [facility] [phone]

## 2024-12-22 RX ORDER — LIDOCAINE 50 MG/G
PATCH TOPICAL
Qty: 90 PATCH | Refills: 0 | Status: SHIPPED | OUTPATIENT
Start: 2024-12-22

## 2025-01-06 ENCOUNTER — APPOINTMENT (OUTPATIENT)
Dept: UROLOGY | Facility: CLINIC | Age: 85
End: 2025-01-06
Payer: MEDICARE

## 2025-02-10 ENCOUNTER — APPOINTMENT (OUTPATIENT)
Dept: PRIMARY CARE | Facility: CLINIC | Age: 85
End: 2025-02-10
Payer: MEDICARE

## 2025-02-10 VITALS
BODY MASS INDEX: 32.72 KG/M2 | DIASTOLIC BLOOD PRESSURE: 70 MMHG | HEIGHT: 62 IN | HEART RATE: 81 BPM | WEIGHT: 177.8 LBS | OXYGEN SATURATION: 97 % | TEMPERATURE: 97.9 F | SYSTOLIC BLOOD PRESSURE: 140 MMHG

## 2025-02-10 DIAGNOSIS — M15.0 PRIMARY OSTEOARTHRITIS INVOLVING MULTIPLE JOINTS: ICD-10-CM

## 2025-02-10 DIAGNOSIS — M70.70 BURSITIS OF HIP, UNSPECIFIED BURSA, UNSPECIFIED LATERALITY: ICD-10-CM

## 2025-02-10 DIAGNOSIS — I10 PRIMARY HYPERTENSION: Primary | ICD-10-CM

## 2025-02-10 DIAGNOSIS — E03.9 ACQUIRED HYPOTHYROIDISM: ICD-10-CM

## 2025-02-10 PROCEDURE — 3078F DIAST BP <80 MM HG: CPT | Performed by: STUDENT IN AN ORGANIZED HEALTH CARE EDUCATION/TRAINING PROGRAM

## 2025-02-10 PROCEDURE — G2211 COMPLEX E/M VISIT ADD ON: HCPCS | Performed by: STUDENT IN AN ORGANIZED HEALTH CARE EDUCATION/TRAINING PROGRAM

## 2025-02-10 PROCEDURE — 1036F TOBACCO NON-USER: CPT | Performed by: STUDENT IN AN ORGANIZED HEALTH CARE EDUCATION/TRAINING PROGRAM

## 2025-02-10 PROCEDURE — 99213 OFFICE O/P EST LOW 20 MIN: CPT | Performed by: STUDENT IN AN ORGANIZED HEALTH CARE EDUCATION/TRAINING PROGRAM

## 2025-02-10 PROCEDURE — 1160F RVW MEDS BY RX/DR IN RCRD: CPT | Performed by: STUDENT IN AN ORGANIZED HEALTH CARE EDUCATION/TRAINING PROGRAM

## 2025-02-10 PROCEDURE — 1123F ACP DISCUSS/DSCN MKR DOCD: CPT | Performed by: STUDENT IN AN ORGANIZED HEALTH CARE EDUCATION/TRAINING PROGRAM

## 2025-02-10 PROCEDURE — 3077F SYST BP >= 140 MM HG: CPT | Performed by: STUDENT IN AN ORGANIZED HEALTH CARE EDUCATION/TRAINING PROGRAM

## 2025-02-10 PROCEDURE — 1159F MED LIST DOCD IN RCRD: CPT | Performed by: STUDENT IN AN ORGANIZED HEALTH CARE EDUCATION/TRAINING PROGRAM

## 2025-02-10 RX ORDER — LOSARTAN POTASSIUM 100 MG/1
100 TABLET ORAL DAILY
Qty: 90 TABLET | Refills: 1 | Status: SHIPPED | OUTPATIENT
Start: 2025-02-10

## 2025-02-10 RX ORDER — LEVOTHYROXINE SODIUM 50 UG/1
50 TABLET ORAL DAILY
Qty: 90 TABLET | Refills: 1 | Status: SHIPPED | OUTPATIENT
Start: 2025-02-10

## 2025-02-10 ASSESSMENT — ENCOUNTER SYMPTOMS
NAUSEA: 0
DYSPHORIC MOOD: 0
NUMBNESS: 0
DEPRESSION: 0
WHEEZING: 0
FREQUENCY: 0
PALPITATIONS: 0
HEADACHES: 0
OCCASIONAL FEELINGS OF UNSTEADINESS: 1
FEVER: 0
DIARRHEA: 0
DYSURIA: 0
HEMATURIA: 0
CHILLS: 0
NERVOUS/ANXIOUS: 0
COUGH: 0
ABDOMINAL PAIN: 0
SHORTNESS OF BREATH: 0
DIZZINESS: 0
FATIGUE: 0
CONSTIPATION: 0

## 2025-02-10 NOTE — PROGRESS NOTES
"Subjective   Patient ID: Pooja Wong is a 84 y.o. female who presents for Med Refill and Follow-up (BLOOD PRESSURE).    HPI     Follow-up of  hypertension. Current medications ARB, CCB, and Thiazide.  Increased amlodipine to 5 mg in November.  Home blood pressure readings:  doesn't take it much b/c bp cuff doesn't always work . Salt intake and diet: Caffeine: none, Salt added to food: no, Salt added to cooking: no, Salty foods:sometimes, Fast food: once to twice a week      She stopped checking BP b/c it made her anxious.    Review of Systems   Constitutional:  Negative for chills, fatigue and fever.   Respiratory:  Negative for cough, shortness of breath and wheezing.    Cardiovascular:  Negative for chest pain, palpitations and leg swelling.   Gastrointestinal:  Negative for abdominal pain, constipation, diarrhea and nausea.   Genitourinary:  Negative for dysuria, frequency, hematuria and urgency.   Neurological:  Negative for dizziness, numbness and headaches.   Psychiatric/Behavioral:  Negative for dysphoric mood. The patient is not nervous/anxious.        Objective   /70 (BP Location: Left arm, Patient Position: Sitting, BP Cuff Size: Large adult)   Pulse 81   Temp 36.6 °C (97.9 °F) (Temporal)   Ht 1.575 m (5' 2.01\")   Wt 80.6 kg (177 lb 12.8 oz)   SpO2 97%   BMI 32.51 kg/m²     Physical Exam  Constitutional:       Appearance: Normal appearance.   HENT:      Head: Normocephalic and atraumatic.   Eyes:      Extraocular Movements: Extraocular movements intact.      Pupils: Pupils are equal, round, and reactive to light.   Cardiovascular:      Rate and Rhythm: Normal rate and regular rhythm.      Heart sounds: Normal heart sounds. No murmur heard.  Pulmonary:      Effort: Pulmonary effort is normal.      Breath sounds: Normal breath sounds. No wheezing.   Musculoskeletal:         General: Normal range of motion.   Skin:     General: Skin is warm and dry.   Neurological:      General: No focal " deficit present.      Mental Status: She is alert and oriented to person, place, and time.   Psychiatric:         Mood and Affect: Mood normal.         Behavior: Behavior normal.         Assessment/Plan   Problem List Items Addressed This Visit       Primary hypertension - Primary    Relevant Medications    losartan (Cozaar) 100 mg tablet    Other Relevant Orders    Lipid Panel    Comprehensive Metabolic Panel    CBC    Acquired hypothyroidism    Relevant Medications    levothyroxine (Synthroid, Levoxyl) 50 mcg tablet    Other Relevant Orders    TSH with reflex to Free T4 if abnormal    Primary osteoarthritis involving multiple joints    Relevant Orders    Disability Placard    Disability Placard    Bursitis of hip    Relevant Orders    Disability Placard    Disability Placard       Blood pressure well-controlled, will continue current medications.    Will see her back this summer for her Medicare wellness with fasting blood work prior    Essie Bishop, DO  2/10/2025

## 2025-03-17 DIAGNOSIS — M15.0 PRIMARY OSTEOARTHRITIS INVOLVING MULTIPLE JOINTS: ICD-10-CM

## 2025-03-17 RX ORDER — LIDOCAINE 50 MG/G
PATCH TOPICAL
Qty: 90 PATCH | Refills: 0 | Status: SHIPPED | OUTPATIENT
Start: 2025-03-17

## 2025-04-16 ENCOUNTER — APPOINTMENT (OUTPATIENT)
Dept: UROLOGY | Facility: CLINIC | Age: 85
End: 2025-04-16
Payer: MEDICARE

## 2025-04-16 VITALS — WEIGHT: 177 LBS | HEIGHT: 62 IN | BODY MASS INDEX: 32.57 KG/M2 | TEMPERATURE: 97.6 F

## 2025-04-16 DIAGNOSIS — N39.41 URGE URINARY INCONTINENCE: Primary | ICD-10-CM

## 2025-04-16 DIAGNOSIS — N95.2 VAGINAL ATROPHY: ICD-10-CM

## 2025-04-16 DIAGNOSIS — Z96.0 PRESENCE OF PESSARY: ICD-10-CM

## 2025-04-16 LAB
POC APPEARANCE, URINE: ABNORMAL
POC BILIRUBIN, URINE: NEGATIVE
POC BLOOD, URINE: ABNORMAL
POC COLOR, URINE: YELLOW
POC GLUCOSE, URINE: NEGATIVE MG/DL
POC KETONES, URINE: NEGATIVE MG/DL
POC LEUKOCYTES, URINE: ABNORMAL
POC NITRITE,URINE: NEGATIVE
POC PH, URINE: 7 PH
POC PROTEIN, URINE: ABNORMAL MG/DL
POC SPECIFIC GRAVITY, URINE: 1.01
POC UROBILINOGEN, URINE: 0.2 EU/DL

## 2025-04-16 PROCEDURE — 1123F ACP DISCUSS/DSCN MKR DOCD: CPT | Performed by: PHYSICIAN ASSISTANT

## 2025-04-16 PROCEDURE — 99214 OFFICE O/P EST MOD 30 MIN: CPT | Performed by: PHYSICIAN ASSISTANT

## 2025-04-16 PROCEDURE — 81002 URINALYSIS NONAUTO W/O SCOPE: CPT | Performed by: PHYSICIAN ASSISTANT

## 2025-04-16 PROCEDURE — 1159F MED LIST DOCD IN RCRD: CPT | Performed by: PHYSICIAN ASSISTANT

## 2025-04-16 PROCEDURE — 1036F TOBACCO NON-USER: CPT | Performed by: PHYSICIAN ASSISTANT

## 2025-04-16 ASSESSMENT — ENCOUNTER SYMPTOMS
FREQUENCY: 1
RESPIRATORY NEGATIVE: 1
PSYCHIATRIC NEGATIVE: 1
CARDIOVASCULAR NEGATIVE: 1
NEUROLOGICAL NEGATIVE: 1
ALLERGIC/IMMUNOLOGIC NEGATIVE: 1
GASTROINTESTINAL NEGATIVE: 1
CONSTITUTIONAL NEGATIVE: 1
EYES NEGATIVE: 1
MUSCULOSKELETAL NEGATIVE: 1
ENDOCRINE NEGATIVE: 1
HEMATOLOGIC/LYMPHATIC NEGATIVE: 1

## 2025-04-16 NOTE — PROGRESS NOTES
Subjective   Patient ID: Pooja Wong is a 84 y.o. female who presents for Follow-up (Pessary check up).  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     Patient reports some pelvic pressure when pessary pushes down, but improves when she pushes it back.   She reports urinary frequency, urgency and urge incontinence, but does not want any treatment for it at this time.     Pessary cleaned and returned without complications. No excoriations noted      Office Visit on 04/16/2025   Component Date Value Ref Range Status    POC Color, Urine 04/16/2025 Yellow  Straw, Yellow, Light-Yellow Final    POC Appearance, Urine 04/16/2025 Cloudy (A)  Clear Final    POC Glucose, Urine 04/16/2025 NEGATIVE  NEGATIVE mg/dl Final    POC Bilirubin, Urine 04/16/2025 NEGATIVE  NEGATIVE Final    POC Ketones, Urine 04/16/2025 NEGATIVE  NEGATIVE mg/dl Final    POC Specific Gravity, Urine 04/16/2025 1.015  1.005 - 1.035 Final    POC Blood, Urine 04/16/2025 TRACE-Intact (A)  NEGATIVE Final    POC PH, Urine 04/16/2025 7.0  No Reference Range Established PH Final    POC Protein, Urine 04/16/2025 100 (2+) (A)  NEGATIVE mg/dl Final    POC Urobilinogen, Urine 04/16/2025 0.2  0.2, 1.0 EU/DL Final    Poc Nitrite, Urine 04/16/2025 NEGATIVE  NEGATIVE Final    POC Leukocytes, Urine 04/16/2025 LARGE (3+) (A)  NEGATIVE Final       Review of Systems   Constitutional: Negative.    HENT: Negative.     Eyes: Negative.    Respiratory: Negative.     Cardiovascular: Negative.    Gastrointestinal: Negative.    Endocrine: Negative.    Genitourinary:  Positive for frequency and urgency.   Musculoskeletal: Negative.    Skin: Negative.    Allergic/Immunologic: Negative.    Neurological: Negative.    Hematological: Negative.    Psychiatric/Behavioral:  Negative.         Objective   Physical Exam  Constitutional:       General: She is not in acute distress.     Appearance: Normal appearance.   HENT:      Head: Normocephalic and atraumatic.      Nose: Nose normal.      Mouth/Throat:      Mouth: Mucous membranes are dry.   Cardiovascular:      Rate and Rhythm: Normal rate.   Pulmonary:      Effort: Pulmonary effort is normal.   Abdominal:      General: Abdomen is flat.      Palpations: Abdomen is soft.   Musculoskeletal:         General: Normal range of motion.      Cervical back: Normal range of motion and neck supple.   Skin:     General: Skin is warm and dry.   Neurological:      General: No focal deficit present.      Mental Status: She is alert and oriented to person, place, and time.   Psychiatric:         Mood and Affect: Mood normal.         Assessment/Plan     84-year-old with stage II anterior and apical post hysterectomy prolapse, urge urinary incontinence, postmenopausal bleeding associated with vaginal excoriation, and pelvic floor weakness presenting for 2.5 inch Gellhorn short stem placement 2/21/2023.     #1 we discussed the patient's prolapse complaints. We discussed expectant management, pelvic floor physical therapy, pessary management, and surgical options. She was successfully fitted with a 2.5 inch Gellhorn short stem pessary 2/21/2023. 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 She however appears to be having excellent results with her pessary. She will continue every 4-5 month follow-up. She may benefit from a 2.5 inch Shatz pessary moving forwards.     2. We discussed the patient's urge urinary incontinence complaints. We again discussed at length today the [facility] care pathway including first, second, third line therapies. She is not interested in therapy at this time.    3. We again discussed the safety, efficacy, proper utilization of vaginal estrogen therapy. She  will restart this 3 times a week moving forward.       Follow up in 4 months for next Pessary cleaning         Mesfin Crook PA-C 04/16/25 1:26 PM

## 2025-04-19 LAB
APPEARANCE UR: CLEAR
BACTERIA #/AREA URNS HPF: ABNORMAL /HPF
BACTERIA UR CULT: ABNORMAL
BILIRUB UR QL STRIP: NEGATIVE
COLOR UR: YELLOW
GLUCOSE UR QL STRIP: NEGATIVE
HGB UR QL STRIP: NEGATIVE
HYALINE CASTS #/AREA URNS LPF: ABNORMAL /LPF
KETONES UR QL STRIP: NEGATIVE
LEUKOCYTE ESTERASE UR QL STRIP: ABNORMAL
NITRITE UR QL STRIP: NEGATIVE
PH UR STRIP: 7.5 [PH] (ref 5–8)
PROT UR QL STRIP: ABNORMAL
RBC #/AREA URNS HPF: ABNORMAL /HPF
SERVICE CMNT-IMP: ABNORMAL
SP GR UR STRIP: 1.01 (ref 1–1.03)
SQUAMOUS #/AREA URNS HPF: ABNORMAL /HPF
WBC #/AREA URNS HPF: ABNORMAL /HPF

## 2025-05-28 DIAGNOSIS — I10 PRIMARY HYPERTENSION: ICD-10-CM

## 2025-05-28 RX ORDER — AMLODIPINE BESYLATE 5 MG/1
5 TABLET ORAL DAILY
Qty: 90 TABLET | Refills: 0 | Status: SHIPPED | OUTPATIENT
Start: 2025-05-28

## 2025-06-13 DIAGNOSIS — M15.0 PRIMARY OSTEOARTHRITIS INVOLVING MULTIPLE JOINTS: ICD-10-CM

## 2025-06-13 RX ORDER — LIDOCAINE 50 MG/G
PATCH TOPICAL
Qty: 90 PATCH | Refills: 0 | Status: SHIPPED | OUTPATIENT
Start: 2025-06-13

## 2025-06-19 LAB
ALBUMIN SERPL-MCNC: 4.1 G/DL (ref 3.6–5.1)
ALP SERPL-CCNC: 63 U/L (ref 37–153)
ALT SERPL-CCNC: 12 U/L (ref 6–29)
ANION GAP SERPL CALCULATED.4IONS-SCNC: 10 MMOL/L (CALC) (ref 7–17)
AST SERPL-CCNC: 17 U/L (ref 10–35)
BILIRUB SERPL-MCNC: 0.4 MG/DL (ref 0.2–1.2)
BUN SERPL-MCNC: 20 MG/DL (ref 7–25)
CALCIUM SERPL-MCNC: 9.9 MG/DL (ref 8.6–10.4)
CHLORIDE SERPL-SCNC: 103 MMOL/L (ref 98–110)
CHOLEST SERPL-MCNC: 174 MG/DL
CHOLEST/HDLC SERPL: 2.9 (CALC)
CO2 SERPL-SCNC: 25 MMOL/L (ref 20–32)
CREAT SERPL-MCNC: 0.88 MG/DL (ref 0.6–0.95)
EGFRCR SERPLBLD CKD-EPI 2021: 64 ML/MIN/1.73M2
ERYTHROCYTE [DISTWIDTH] IN BLOOD BY AUTOMATED COUNT: 12.7 % (ref 11–15)
GLUCOSE SERPL-MCNC: 101 MG/DL (ref 65–99)
HCT VFR BLD AUTO: 38.6 % (ref 35–45)
HDLC SERPL-MCNC: 59 MG/DL
HGB BLD-MCNC: 12.7 G/DL (ref 11.7–15.5)
LDLC SERPL CALC-MCNC: 88 MG/DL (CALC)
MCH RBC QN AUTO: 29.1 PG (ref 27–33)
MCHC RBC AUTO-ENTMCNC: 32.9 G/DL (ref 32–36)
MCV RBC AUTO: 88.5 FL (ref 80–100)
NONHDLC SERPL-MCNC: 115 MG/DL (CALC)
PLATELET # BLD AUTO: 459 THOUSAND/UL (ref 140–400)
PMV BLD REES-ECKER: 10.1 FL (ref 7.5–12.5)
POTASSIUM SERPL-SCNC: 4.4 MMOL/L (ref 3.5–5.3)
PROT SERPL-MCNC: 6.8 G/DL (ref 6.1–8.1)
RBC # BLD AUTO: 4.36 MILLION/UL (ref 3.8–5.1)
SODIUM SERPL-SCNC: 138 MMOL/L (ref 135–146)
TRIGL SERPL-MCNC: 170 MG/DL
TSH SERPL-ACNC: 3.21 MIU/L (ref 0.4–4.5)
WBC # BLD AUTO: 8.9 THOUSAND/UL (ref 3.8–10.8)

## 2025-06-23 ENCOUNTER — APPOINTMENT (OUTPATIENT)
Dept: PRIMARY CARE | Facility: CLINIC | Age: 85
End: 2025-06-23
Payer: MEDICARE

## 2025-06-23 VITALS
TEMPERATURE: 98.5 F | WEIGHT: 179.2 LBS | DIASTOLIC BLOOD PRESSURE: 76 MMHG | SYSTOLIC BLOOD PRESSURE: 136 MMHG | OXYGEN SATURATION: 95 % | HEART RATE: 86 BPM | BODY MASS INDEX: 33.83 KG/M2 | HEIGHT: 61 IN

## 2025-06-23 DIAGNOSIS — I10 PRIMARY HYPERTENSION: ICD-10-CM

## 2025-06-23 DIAGNOSIS — Z00.00 MEDICARE ANNUAL WELLNESS VISIT, SUBSEQUENT: Primary | ICD-10-CM

## 2025-06-23 DIAGNOSIS — E03.9 ACQUIRED HYPOTHYROIDISM: ICD-10-CM

## 2025-06-23 DIAGNOSIS — Z00.00 HEALTH MAINTENANCE EXAMINATION: ICD-10-CM

## 2025-06-23 DIAGNOSIS — Z13.31 DEPRESSION SCREENING: ICD-10-CM

## 2025-06-23 PROCEDURE — 1159F MED LIST DOCD IN RCRD: CPT | Performed by: STUDENT IN AN ORGANIZED HEALTH CARE EDUCATION/TRAINING PROGRAM

## 2025-06-23 PROCEDURE — 1036F TOBACCO NON-USER: CPT | Performed by: STUDENT IN AN ORGANIZED HEALTH CARE EDUCATION/TRAINING PROGRAM

## 2025-06-23 PROCEDURE — G0444 DEPRESSION SCREEN ANNUAL: HCPCS | Performed by: STUDENT IN AN ORGANIZED HEALTH CARE EDUCATION/TRAINING PROGRAM

## 2025-06-23 PROCEDURE — 3078F DIAST BP <80 MM HG: CPT | Performed by: STUDENT IN AN ORGANIZED HEALTH CARE EDUCATION/TRAINING PROGRAM

## 2025-06-23 PROCEDURE — G2211 COMPLEX E/M VISIT ADD ON: HCPCS | Performed by: STUDENT IN AN ORGANIZED HEALTH CARE EDUCATION/TRAINING PROGRAM

## 2025-06-23 PROCEDURE — 3075F SYST BP GE 130 - 139MM HG: CPT | Performed by: STUDENT IN AN ORGANIZED HEALTH CARE EDUCATION/TRAINING PROGRAM

## 2025-06-23 PROCEDURE — 99397 PER PM REEVAL EST PAT 65+ YR: CPT | Performed by: STUDENT IN AN ORGANIZED HEALTH CARE EDUCATION/TRAINING PROGRAM

## 2025-06-23 PROCEDURE — 1160F RVW MEDS BY RX/DR IN RCRD: CPT | Performed by: STUDENT IN AN ORGANIZED HEALTH CARE EDUCATION/TRAINING PROGRAM

## 2025-06-23 PROCEDURE — 1170F FXNL STATUS ASSESSED: CPT | Performed by: STUDENT IN AN ORGANIZED HEALTH CARE EDUCATION/TRAINING PROGRAM

## 2025-06-23 PROCEDURE — 99213 OFFICE O/P EST LOW 20 MIN: CPT | Performed by: STUDENT IN AN ORGANIZED HEALTH CARE EDUCATION/TRAINING PROGRAM

## 2025-06-23 PROCEDURE — G0439 PPPS, SUBSEQ VISIT: HCPCS | Performed by: STUDENT IN AN ORGANIZED HEALTH CARE EDUCATION/TRAINING PROGRAM

## 2025-06-23 PROCEDURE — 1158F ADVNC CARE PLAN TLK DOCD: CPT | Performed by: STUDENT IN AN ORGANIZED HEALTH CARE EDUCATION/TRAINING PROGRAM

## 2025-06-23 RX ORDER — AMLODIPINE BESYLATE 5 MG/1
5 TABLET ORAL DAILY
Qty: 90 TABLET | Refills: 1 | Status: SHIPPED | OUTPATIENT
Start: 2025-06-23

## 2025-06-23 RX ORDER — LEVOTHYROXINE SODIUM 50 UG/1
50 TABLET ORAL DAILY
Qty: 90 TABLET | Refills: 3 | Status: SHIPPED | OUTPATIENT
Start: 2025-06-23

## 2025-06-23 RX ORDER — LOSARTAN POTASSIUM 100 MG/1
100 TABLET ORAL DAILY
Qty: 90 TABLET | Refills: 1 | Status: SHIPPED | OUTPATIENT
Start: 2025-06-23

## 2025-06-23 RX ORDER — HYDROCHLOROTHIAZIDE 25 MG/1
25 TABLET ORAL DAILY
Qty: 90 TABLET | Refills: 1 | Status: SHIPPED | OUTPATIENT
Start: 2025-06-23

## 2025-06-23 ASSESSMENT — ENCOUNTER SYMPTOMS
DYSPHORIC MOOD: 0
ABDOMINAL PAIN: 0
CHILLS: 0
FATIGUE: 0
FEVER: 0
COUGH: 0
WHEEZING: 0
PALPITATIONS: 0
DIZZINESS: 0
CONSTIPATION: 0
NUMBNESS: 0
HEMATURIA: 0
NAUSEA: 0
NERVOUS/ANXIOUS: 0
DEPRESSION: 0
OCCASIONAL FEELINGS OF UNSTEADINESS: 0
HEADACHES: 0
DYSURIA: 0
SHORTNESS OF BREATH: 0
DIARRHEA: 0
FREQUENCY: 0

## 2025-06-23 ASSESSMENT — ACTIVITIES OF DAILY LIVING (ADL)
TAKING_MEDICATION: INDEPENDENT
GROCERY_SHOPPING: INDEPENDENT
BATHING: INDEPENDENT
DRESSING: INDEPENDENT
DOING_HOUSEWORK: INDEPENDENT
MANAGING_FINANCES: NEEDS ASSISTANCE

## 2025-06-23 ASSESSMENT — PATIENT HEALTH QUESTIONNAIRE - PHQ9
4. FEELING TIRED OR HAVING LITTLE ENERGY: MORE THAN HALF THE DAYS
SUM OF ALL RESPONSES TO PHQ9 QUESTIONS 1 AND 2: 0
2. FEELING DOWN, DEPRESSED OR HOPELESS: NOT AT ALL
2. FEELING DOWN, DEPRESSED OR HOPELESS: NOT AT ALL
1. LITTLE INTEREST OR PLEASURE IN DOING THINGS: NOT AT ALL
SUM OF ALL RESPONSES TO PHQ9 QUESTIONS 1 AND 2: 0
1. LITTLE INTEREST OR PLEASURE IN DOING THINGS: NOT AT ALL
3. TROUBLE FALLING OR STAYING ASLEEP OR SLEEPING TOO MUCH: NEARLY EVERY DAY
5. POOR APPETITE OR OVEREATING: NEARLY EVERY DAY

## 2025-06-23 NOTE — PROGRESS NOTES
Subjective   Reason for Visit: Pooja Wong is an 85 y.o. female here for a Medicare Wellness visit.     Past Medical, Surgical, and Family History reviewed and updated in chart.    Reviewed all medications by prescribing practitioner or clinical pharmacist (such as prescriptions, OTCs, herbal therapies and supplements) and documented in the medical record.    HPI  Specialists seen by patient: Urology   - Ortho   -eye doctor   -dentist     Last pap/cervical cancer screening: n/a  Last mammogram: n/a  Hx of colon ca screening: N/A  Hx of DXA: 2024  Immunizations: needs shingrix, tdap, and prevnar 20   Diet: Follows a healthy diet, does eat out  Exercise: Not much  Alcohol abuse screen:   none   How many times in the past year 4 or more drinks in a day? 0  Lung cancer screening:   Smoking history: never a smoker  Drug use: No  Depression screen yearly (phq-9): 8  Living will/healthcare power of :Yes - reviewed    Patient Self Assessment of Health Status  Patient Self Assessment: Good  Functional Ability/Level of Safety  Cognitive Impairment Observed: No cognitive impairment observed  [place] Home Safety Risk Factors  Home Safety Risk Factors: Loose rugs  Nutrition and Exercise  Current Diet: Well Balanced Diet  Adequate Fluid Intake: No  Caffeine: No  Exercise Frequency: No Exercise  ADL Screening  Hearing - Right Ear: Difficulty with noise  Hearing - Left Ear: Difficulty with noise  Bathing: Independent  Dressing: Independent  Walks in Home: Independent  IADL's  Managing Finances: Needs assistance ( manages)  Grocery Shopping: Independent  Taking Medication: Independent  Doing Housework: Independent    Follow-up of  hypertension. Current medications ARB, CCB, and Thiazide.  Home blood pressure readings: not doing. Medication compliance: taking as prescribed.     Blood work came back showing a mildly elevated triglyceride.  Slightly elevated platelet count and blood sugar.  Thyroid within normal  "limits.    Back is really bothering her. Known DDD. Has had CSI and ablations.  Is taking 400 mg of ibuprofen and 500 mg of Tylenol 3 times daily, about 6 to 7 hours apart.  That does work well for her.  Also using lidocaine patches which work well for her.    Patient Care Team:  Essie Bishop DO as PCP - General (Family Medicine)  Essie Bishop DO as PCP - [employer] PCP     Review of Systems   Constitutional:  Negative for chills, fatigue and fever.   Respiratory:  Negative for cough, shortness of breath and wheezing.    Cardiovascular:  Negative for chest pain, palpitations and leg swelling.   Gastrointestinal:  Negative for abdominal pain, constipation, diarrhea and nausea.   Genitourinary:  Negative for dysuria, frequency, hematuria and urgency.   Neurological:  Negative for dizziness, numbness and headaches.   Psychiatric/Behavioral:  Negative for dysphoric mood. The patient is not nervous/anxious.        Objective   Vitals:  /76 (BP Location: Right arm, Patient Position: Sitting, BP Cuff Size: Large adult)   Pulse 86   Temp 36.9 °C (98.5 °F) (Temporal)   Ht 1.54 m (5' 0.63\")   Wt 81.3 kg (179 lb 3.2 oz)   SpO2 95%   BMI 34.27 kg/m²       Physical Exam  Constitutional:       General: She is not in acute distress.     Appearance: Normal appearance.   HENT:      Head: Normocephalic and atraumatic.      Right Ear: Tympanic membrane and ear canal normal.      Left Ear: Tympanic membrane and ear canal normal.      Mouth/Throat:      Mouth: Mucous membranes are moist.      Pharynx: No posterior oropharyngeal erythema.   Eyes:      Extraocular Movements: Extraocular movements intact.      Pupils: Pupils are equal, round, and reactive to light.   Cardiovascular:      Rate and Rhythm: Normal rate and regular rhythm.      Heart sounds: No murmur heard.  Pulmonary:      Effort: Pulmonary effort is normal. No respiratory distress.      Breath sounds: Normal breath sounds. No wheezing. "   Abdominal:      General: Bowel sounds are normal.      Palpations: Abdomen is soft.      Tenderness: There is no abdominal tenderness. There is no guarding.   Musculoskeletal:         General: Normal range of motion.      Cervical back: Neck supple.   Skin:     General: Skin is warm and dry.   Neurological:      General: No focal deficit present.      Mental Status: She is alert and oriented to person, place, and time.   Psychiatric:         Mood and Affect: Mood normal.         Behavior: Behavior normal.         Assessment/Plan   Problem List Items Addressed This Visit       Primary hypertension    Relevant Medications    amLODIPine (Norvasc) 5 mg tablet    hydroCHLOROthiazide (HYDRODiuril) 25 mg tablet    losartan (Cozaar) 100 mg tablet    Acquired hypothyroidism    Relevant Medications    levothyroxine (Synthroid, Levoxyl) 50 mcg tablet     Other Visit Diagnoses         Medicare annual wellness visit, subsequent    -  Primary      Health maintenance examination          Depression screening                Fasting blood work reviewed we reviewed appropriate preventative health screening guidelines.   We discussed regular aerobic exercise. We discussed proper nutrition and weight control.    Blood pressure well-controlled, will continue current medications.    5-10 minutes were spent screening for depression using PHQ-2/PHQ-9 as documented in the chart

## 2025-09-05 ENCOUNTER — APPOINTMENT (OUTPATIENT)
Dept: UROLOGY | Facility: CLINIC | Age: 85
End: 2025-09-05
Payer: MEDICARE

## 2025-09-05 ASSESSMENT — ENCOUNTER SYMPTOMS
CARDIOVASCULAR NEGATIVE: 1
ENDOCRINE NEGATIVE: 1
GASTROINTESTINAL NEGATIVE: 1
ALLERGIC/IMMUNOLOGIC NEGATIVE: 1
HEMATOLOGIC/LYMPHATIC NEGATIVE: 1
FREQUENCY: 1
CONSTITUTIONAL NEGATIVE: 1
MUSCULOSKELETAL NEGATIVE: 1
EYES NEGATIVE: 1
RESPIRATORY NEGATIVE: 1
PSYCHIATRIC NEGATIVE: 1
NEUROLOGICAL NEGATIVE: 1

## 2025-09-05 ASSESSMENT — PATIENT HEALTH QUESTIONNAIRE - PHQ9
SUM OF ALL RESPONSES TO PHQ9 QUESTIONS 1 AND 2: 0
1. LITTLE INTEREST OR PLEASURE IN DOING THINGS: NOT AT ALL
2. FEELING DOWN, DEPRESSED OR HOPELESS: NOT AT ALL

## 2025-12-12 ENCOUNTER — APPOINTMENT (OUTPATIENT)
Dept: PRIMARY CARE | Facility: CLINIC | Age: 85
End: 2025-12-12
Payer: MEDICARE

## 2026-01-26 ENCOUNTER — APPOINTMENT (OUTPATIENT)
Dept: UROLOGY | Facility: CLINIC | Age: 86
End: 2026-01-26
Payer: MEDICARE

## 2026-01-27 ENCOUNTER — APPOINTMENT (OUTPATIENT)
Dept: UROLOGY | Facility: CLINIC | Age: 86
End: 2026-01-27
Payer: MEDICARE